# Patient Record
Sex: FEMALE | Race: WHITE | Employment: FULL TIME | URBAN - METROPOLITAN AREA
[De-identification: names, ages, dates, MRNs, and addresses within clinical notes are randomized per-mention and may not be internally consistent; named-entity substitution may affect disease eponyms.]

---

## 2017-05-22 ENCOUNTER — OFFICE VISIT (OUTPATIENT)
Dept: ONCOLOGY | Age: 57
End: 2017-05-22

## 2017-05-22 ENCOUNTER — HOSPITAL ENCOUNTER (OUTPATIENT)
Dept: LAB | Age: 57
Discharge: HOME | End: 2017-05-22
Payer: COMMERCIAL

## 2017-05-22 VITALS
WEIGHT: 175 LBS | RESPIRATION RATE: 18 BRPM | SYSTOLIC BLOOD PRESSURE: 141 MMHG | TEMPERATURE: 97.7 F | OXYGEN SATURATION: 94 % | BODY MASS INDEX: 29.12 KG/M2 | DIASTOLIC BLOOD PRESSURE: 84 MMHG | HEART RATE: 72 BPM

## 2017-05-22 DIAGNOSIS — Z12.72 SPECIAL SCREENING FOR MALIGNANT NEOPLASMS, VAGINA: ICD-10-CM

## 2017-05-22 DIAGNOSIS — Z90.710 VAGINAL PAP SMEAR FOLLOWING HYSTERECTOMY FOR MALIGNANCY: ICD-10-CM

## 2017-05-22 DIAGNOSIS — C54.1 ENDOMETRIAL CANCER (HCC): Primary | ICD-10-CM

## 2017-05-22 DIAGNOSIS — Z85.42 PERSONAL HISTORY OF MALIGNANT NEOPLASM OF OTHER PARTS OF UTERUS: ICD-10-CM

## 2017-05-22 DIAGNOSIS — Z08 VAGINAL PAP SMEAR FOLLOWING HYSTERECTOMY FOR MALIGNANCY: ICD-10-CM

## 2017-05-22 PROCEDURE — 88175 CYTOPATH C/V AUTO FLUID REDO: CPT | Performed by: OBSTETRICS & GYNECOLOGY

## 2017-05-22 RX ORDER — GLUCOSAMINE SULFATE 1500 MG
POWDER IN PACKET (EA) ORAL DAILY
COMMUNITY

## 2017-05-22 RX ORDER — LANOLIN ALCOHOL/MO/W.PET/CERES
1000 CREAM (GRAM) TOPICAL DAILY
COMMUNITY
End: 2020-01-22

## 2017-05-22 NOTE — PROGRESS NOTES
[facility]  [address] [place] [address], [address] [facility], [address] [place] [address]   [phone]  Yves Samayoa MD      Patient ID:  Name: Martin Meckel  MRN: [id number]  : 1960/56 y.o. Visit date: 2017    INTERVAL HISTORY: Martin Meckel is a 49 y.o.  female with a history of Stage 1A, G1 Endometrial Cancer, diagnosed 4/13/10 as incidental finding after [facility] by Dr. Sánchez Self at [facility]. S/P LPS trachelectomy/BSO/PPALND with no residual disease. She is being seen today for a yearly followup. Last PAP was 16 and was satis and neg. Patient denies any Gyn symptoms. Patient specifically denies any abnormal vaginal bleeding, vaginal discharge, or vaginal irritation. Patient also denies abdominal pain, pelvic pain, or abdominal bloating. Patient is voiding without difficulty and bowel movements are regular. IMAGING:  Last CT: at [facility] 13  Mammogram: 2014 WNL  Bone Scan: NA  Colonoscopy: 9/3/13 Dr. Neville Alfonso, 3 tubular adenomas, due in 2016    COMPREHENSIVE ROS:  Scanned in media and reviewed by me. Pertinent positives and negatives detailed in HPI. PMH:  New diagnosis of Type 2 DM  Past Medical History:   Diagnosis Date    Depression     Endomet[facility] ([facility] [address]     Hypertension     Type 2 diabetes mellitus ([facility] [address]        PSH:  Past Surgical History:   Procedure Laterality Date    HX OTHER SURGICAL      fallopian tubes, cervix, peritoneal biopsies, lymph nodes    HX SALPINGO-OOPHORECTOMY      LAP, SUPRACERVIAL HYSTERECTOMY, >250G  2010     SOC:  Social History     Social History    Marital status:      Spouse name: N/A    Number of children: N/A    Years of education: N/A     Occupational History    Not on file.      Social History Main Topics    Smoking status: Current Every Day Smoker    Smokeless tobacco: Not on file    Alcohol use Yes    Drug use: Not on file    Sexual activity: Not on file Other Topics Concern    Not on file     Social History Narrative     Family History  Family History   Problem Relation Age of Onset    Cancer Mother      breast    Diabetes Mother     Hypertension Mother     Hypertension Father      Medications:  Current Outpatient Prescriptions on File Prior to Visit   Medication Sig Dispense Refill    traZODone (DESYREL) 100 mg tablet Take 100 mg by mouth nightly.  UI-YW-ZY-Fe-Min-Lycopen-Lutein (CENTRUM) 0.4-162-18 mg tab Take  by mouth.  calcium-vitamin D (OYSTER SHELL) 500 mg(1,250mg) -200 unit per tablet Take 1 Tab by mouth two (2) times daily (with meals).  fenofibrate (TRICOR) 160 mg tablet Take 160 mg by mouth daily.  metFORMIN (GLUCOPHAGE) 500 mg tablet Take  by mouth two (2) times daily (with meals).  metoprolol (LOPRESSOR) 100 mg tablet Take  by mouth two (2) times a day.  omeprazole (PRILOSEC) 20 mg capsule Take 20 mg by mouth daily.  OMEGA-3 ACID ETHYL ESTERS (LOVAZA PO) Take  by mouth.  lisinopril (PRINIVIL, ZESTRIL) 20 mg tablet Take  by mouth daily.  citalopram (CELEXA) 40 mg tablet Take 40 mg by mouth daily. No current facility-administered medications on file prior to visit.         No Known Allergies      OBJECTIVE:  PHYSICAL EXAM  VITAL SIGNS: Visit Vitals    /84    Pulse 72    Temp 97.7 °F (36.5 °C) (Oral)    Resp 18    Wt 79.4 kg (175 lb)    SpO2 94%    BMI 29.12 kg/m2      GENERAL FABY: in no apparent distress and well developed and well nourished   HEENT: NCAT,EOMI,PERRL   RESPIRATORY: scattered wheezing and rhonchi, no crackles   CARDIOVASC: Regular rate and rhythm or without murmur or extra heart sounds   GASTROINT: soft, non-tender, non-distended, without masses or organomegaly, normal active bowel sounds   MUSCULOSKEL: no joint tenderness, deformity or swelling   INTEGUMENT:  warm and dry, no rashes or lesions   EXTREMITIES: extremities normal, atraumatic, no cyanosis or edema   PELVIC: External genitalia: normal general appearance  Urinary system: urethral meatus normal  Vaginal: normal mucosa without prolapse or lesions  Cervix: removed surgically  Adnexa: removed surgically  Uterus: removed surgically   RECTAL: deferred   LEX SURVEY: Cervical, supraclavicular, axillary and inguinal nodes normal.   NEURO: Grossly normal       IMPRESSION AND PLAN:  Stage IA, G1 Endometrial Cancer, CLARENCE  Pelvic with cytology done today. Stress urinary incontinence, unchanged. I counseled patient about referral to Urogynecologist and recommended Laurie Salvador MD. Patient will call if she decides to proceed with referral.  Patient's mother was diagnosed with breast cancer. Patient is up to date on Mammograms  Follow up in 12 months for EM CA surveillance. Brian Pelayo.  Bettina DUMONT  Gynecologic Oncology  8/27/923142:98 AM

## 2017-05-22 NOTE — MR AVS SNAPSHOT
Visit Information Date & Time Provider Department Dept. Phone Encounter #  
 5/22/2017  9:15 AM MD [facility] [phone] [id number] Your Appointments 5/23/2018  9:15 AM  
ANNUAL with MD [facility]) Appt Note: YEARLY  
 [address] [address] Upcoming Health Maintenance Date Due Hepatitis C Screening 1960 Pneumococcal 19-64 Highest Risk (1 of 3 - PCV13) 11/9/1981 DTaP/Tdap/Td series (1 - Tdap) 11/9/1983 BREAST CANCER SCRN MAMMOGRAM 11/9/2012 FOBT Q 1 YEAR AGE 50-75 11/9/2012 INFLUENZA AGE 9 TO ADULT 8/1/2017 PAP AKA CERVICAL CYTOLOGY 5/4/2019 Allergies as of 5/22/2017  Review Complete On: 5/22/2017 By: Que Beth MD  
 No Known Allergies Current Immunizations  Never Reviewed No immunizations on file. Not reviewed this visit You Were Diagnosed With   
  
 Codes Comments Endometrial cancer ([facility] [address]    -  Primary ICD-10-CM: C54.1 ICD-9-CM: 182.0 Special screening for malignant neoplasms, vagina     ICD-10-CM: Z12.72 
ICD-9-CM: V76.47 Vaginal Pap smear following hysterectomy for malignancy     ICD-10-CM: Z08, Z90.710 ICD-9-CM: V67.01 Personal history of malignant neoplasm of other parts of uterus     ICD-10-CM: Z85.42 
ICD-9-CM: V10.42 Vitals BP Pulse Temp Resp Weight(growth percentile) SpO2  
 141/84 72 97.7 °F (36.5 °C) (Oral) 18 175 lb (79.4 kg) 94% BMI OB Status Smoking Status 29.12 kg/m2 Hysterectomy Current Every Day Smoker BMI and BSA Data Body Mass Index Body Surface Area  
 29.12 kg/m 2 1.91 m 2 Preferred Pharmacy Pharmacy Name Phone [id number] [phone] Your Updated Medication List  
  
   
This list is accurate as of: 5/22/17 10:17 AM.  Always use your most recent med list.  
  
  
  
  
 calcium-vitamin D 500 mg(1,250mg) -200 unit per tablet Commonly known as:  OYSTER SHELL Take 1 Tab by mouth two (2) times daily (with meals). CENTRUM 0.4-162-18 mg Tab Generic drug:  CL-CG-NX-Fe-Min-Lycopen-Lutein Take  by mouth.  
  
 citalopram 40 mg tablet Commonly known as:  Juanita Keel Take 40 mg by mouth daily. cyanocobalamin 1,000 mcg tablet Take 1,000 mcg by mouth daily. fenofibrate 160 mg tablet Commonly known as:  LOFIBRA Take 160 mg by mouth daily. lisinopril 20 mg tablet Commonly known as:  Minus Salk Take  by mouth daily. LOVAZA PO Take  by mouth.  
  
 metFORMIN 500 mg tablet Commonly known as:  GLUCOPHAGE Take  by mouth two (2) times daily (with meals). metoprolol tartrate 100 mg IR tablet Commonly known as:  LOPRESSOR Take  by mouth two (2) times a day. omeprazole 20 mg capsule Commonly known as:  PRILOSEC Take 20 mg by mouth daily. traZODone 100 mg tablet Commonly known as:  Guillermo Risen Take 100 mg by mouth nightly. VITAMIN D3 1,000 unit Cap Generic drug:  cholecalciferol Take  by mouth daily. Introducing [facility]! [facility] introduces [employer] patient portal. Now you can access parts of your medical record, email your doctor's office, and request medication refills online. 1. In your internet browser, go to [email or web address] 2. Click on the First Time User? Click Here link in the Sign In box. You will see the New Member Sign Up page. 3. Enter your [employer] Access Code exactly as it appears below. You will not need to use this code after youve completed the sign-up process. If you do not sign up before the expiration date, you must request a new code.  
 
· [employer] Access Code: [email or web address] 
 Expires: 8/20/2017 10:17 AM 
 
4. Enter the last four digits of your Social Security Number (xxxx) and Date of Birth (mm/dd/yyyy) as indicated and click Submit. You will be taken to the next sign-up page. 5. Create a [employer] ID. This will be your [employer] login ID and cannot be changed, so think of one that is secure and easy to remember. 6. Create a [employer] password. You can change your password at any time. 7. Enter your Password Reset Question and Answer. This can be used at a later time if you forget your password. 8. Enter your e-mail address. You will receive e-mail notification when new information is available in [address] 9. Click Sign Up. You can now view and download portions of your medical record. 10. Click the Download Summary menu link to download a portable copy of your medical information. If you have questions, please visit the Frequently Asked Questions section of the [employer] website. Remember, [employer] is NOT to be used for urgent needs. For medical emergencies, dial 911. Now available from your iPhone and Android! Please provide this summary of care documentation to your next provider. Your primary care clinician is listed as Kristina Kevin. If you have any questions after today's visit, please call [phone].

## 2018-05-25 ENCOUNTER — OFFICE VISIT (OUTPATIENT)
Dept: ONCOLOGY | Age: 58
End: 2018-05-25

## 2018-05-25 VITALS
OXYGEN SATURATION: 98 % | HEIGHT: 65 IN | HEART RATE: 70 BPM | RESPIRATION RATE: 18 BRPM | DIASTOLIC BLOOD PRESSURE: 87 MMHG | SYSTOLIC BLOOD PRESSURE: 151 MMHG | TEMPERATURE: 97.9 F | WEIGHT: 168.6 LBS | BODY MASS INDEX: 28.09 KG/M2

## 2018-05-25 DIAGNOSIS — C54.1 ENDOMETRIAL CANCER (HCC): Primary | ICD-10-CM

## 2018-05-25 NOTE — MR AVS SNAPSHOT
[place] [address] 
[phone] Patient: Ly Rosado MRN: [id number] :1960 Visit Information Date & Time Provider Department Dept. Phone Encounter #  
 2018  9:00 AM Babs Muhammad NP [facility] [phone] [id number] Follow-up Instructions Return in about 1 year (around 2019). Upcoming Health Maintenance Date Due Hepatitis C Screening 1960 Pneumococcal 19-64 Highest Risk (1 of 3 - PCV13) 1981 DTaP/Tdap/Td series (1 - Tdap) 1983 FOBT Q 1 YEAR AGE 50-75 2012 Influenza Age 5 to Adult 2018 BREAST CANCER SCRN MAMMOGRAM 2020 PAP AKA CERVICAL CYTOLOGY 2020 Allergies as of 2018  Review Complete On: 2018 By: Babs Muhammad NP No Known Allergies Current Immunizations  Never Reviewed No immunizations on file. Not reviewed this visit Vitals BP Pulse Temp Resp Height(growth percentile) Weight(growth percentile) 151/87 (BP 1 Location: Right arm, BP Patient Position: Sitting) 70 97.9 °F (36.6 °C) (Oral) 18 5' 5\" (1.651 m) 168 lb 9.6 oz (76.5 kg) SpO2 BMI OB Status Smoking Status 98% 28.06 kg/m2 Hysterectomy Current Every Day Smoker BMI and BSA Data Body Mass Index Body Surface Area 28.06 kg/m 2 1.87 m 2 Your Updated Medication List  
  
   
This list is accurate as of 18  9:31 AM.  Always use your most recent med list.  
  
  
  
  
 calcium-vitamin D 500 mg(1,250mg) -200 unit per tablet Commonly known as:  OYSTER SHELL Take 1 Tab by mouth two (2) times daily (with meals). CENTRUM 0.4-162-18 mg Tab Generic drug:  HJ-NV-LQ-Fe-Min-Lycopen-Lutein Take  by mouth.  
  
 citalopram 40 mg tablet Commonly known as:  Cloyce Tram Take 40 mg by mouth daily. cyanocobalamin 1,000 mcg tablet Take 1,000 mcg by mouth daily. fenofibrate 160 mg tablet Commonly known as:  LOFIBRA Take 160 mg by mouth daily. lisinopril 20 mg tablet Commonly known as:  Edmundo Human Take  by mouth daily. LOVAZA PO Take  by mouth.  
  
 metFORMIN 500 mg tablet Commonly known as:  GLUCOPHAGE Take  by mouth two (2) times daily (with meals). metoprolol tartrate 100 mg IR tablet Commonly known as:  LOPRESSOR Take  by mouth two (2) times a day. omeprazole 20 mg capsule Commonly known as:  PRILOSEC Take 20 mg by mouth daily. traZODone 100 mg tablet Commonly known as:  Paula Veronica Take 100 mg by mouth nightly. VITAMIN D3 1,000 unit Cap Generic drug:  cholecalciferol Take  by mouth daily. Follow-up Instructions Return in about 1 year (around 5/25/2019). Patient Instructions Health Maintenance Due Topic Date Due  
 Hepatitis C Screening  1960  Pneumococcal 19-64 Highest Risk (1 of 3 - PCV13) 11/09/1981  
 DTaP/Tdap/Td series (1 - Tdap) 11/09/1983  
 FOBT Q 1 YEAR AGE 50-75  11/09/2012 Below you will find information on the condition you were previously diagnosed with. Please call the office as soon as possible if you begin to experience the following symptoms: Persistent or worsening abdominal or pelvic pain, any unusual vaginal bleeding, discharge, odor, or irritation, and any changes in bladder or bowel habits such as constipation, diarrhea, burning, or general discomfort. Please call the office if you have any other questions or concerns. Uterine Cancer: Care Instructions Your Care Instructions Uterine cancer is the rapid growth of abnormal cells that line the uterus. It also is called endometrial cancer. These cells may spread to nearby organs, lymph glands, or distant organs. Uterine cancer can be cured most often when found early.  
Treatment may include surgery to remove the uterus, ovaries, fallopian tubes, and sometimes the pelvic lymph nodes. Radiation and hormones to stop cancer growth also are sometimes used. Chemotherapy may be used if the cancer has spread. Having cancer can be scary. You may feel many emotions and may need some help coping. Seek out family, friends, and counselors for support. You can do things at home to make yourself feel better while you go through treatment. You also can call the [employer] ([phone]) or visit its website at [address] [employer] for more information. Follow-up care is a key part of your treatment and safety. Be sure to make and go to all appointments, and call your doctor if you are having problems. It's also a good idea to know your test results and keep a list of the medicines you take. How can you care for yourself at home? · Take your medicines exactly as prescribed. Call your doctor if you think you are having a problem with your medicine. You may get medicine for nausea and vomiting if you have these side effects. · Eat healthy food. If you do not feel like eating, try to eat food that has protein and extra calories to keep up your strength and prevent weight loss. Drink liquid meal replacements for extra calories and protein. Try to eat your main meal early. · Get some physical activity every day, but do not get too tired. Keep doing the hobbies you enjoy as your energy allows. · Take steps to control your stress and workload. Learn relaxation techniques. ¨ Share your feelings. Stress and tension affect our emotions. By expressing your feelings to others, you may be able to understand and cope with them. ¨ Consider joining a support group. Talking about a problem with your spouse, a good friend, or other people with similar problems is a good way to reduce tension and stress. ¨ Express yourself through art. Try writing, dance, art, or crafts to relieve tension.  Some dance, writing, or art groups may be available just for people who have cancer. ¨ Be kind to your body and mind. Getting enough sleep, eating a healthy diet, and taking time to do things you enjoy can contribute to an overall feeling of balance in your life and help reduce stress. ¨ Get help if you need it. Discuss your concerns with your doctor or counselor. · If you are vomiting or have diarrhea: ¨ Drink plenty of fluids (enough so that your urine is light yellow or clear like water) to prevent dehydration. Choose water and other caffeine-free clear liquids. If you have kidney, heart, or liver disease and have to limit fluids, talk with your doctor before you increase the amount of fluids you drink. ¨ When you are able to eat, try clear soups, mild foods, and liquids until all symptoms are gone for 12 to 48 hours. Other good choices include dry toast, crackers, cooked cereal, and gelatin dessert, such as Jell-O. 
· Take care of your urinary tract to prevent problems such as infection, which can be caused by uterine cancer and its treatment. Limit drinks with caffeine, drink plenty of fluids, and urinate every 3 to 4 hours. · If you have not already done so, prepare a list of advance directives. Advance directives are instructions to your doctor and family members about what kind of care you want if you become unable to speak or express yourself. When should you call for help? Call 911 anytime you think you may need emergency care. For example, call if: 
? · You passed out (lost consciousness). ?Call your doctor now or seek immediate medical care if: 
? · You have a fever. ? · You have abnormal bleeding. ? · You have new or worse pain. ? · You think you have an infection. ? · You have new symptoms, such as a cough, belly pain, vomiting, diarrhea, or a rash. ? Watch closely for changes in your health, and be sure to contact your doctor if: 
? · You are much more tired than usual.  
? · You have swollen glands in your armpits, groin, or neck. ? · You do not get better as expected. Where can you learn more? Go to http://samanta-damián.info/. Enter E343 in the search box to learn more about \"Uterine Cancer: Care Instructions. \" Current as of: May 12, 2017 Content Version: 11.4 © 5487-7444 [employer]. Care instructions adapted under license by [employer] (which disclaims liability or warranty for this information). If you have questions about a medical condition or this instruction, always ask your healthcare professional. [address] any warranty or liability for your use of this information. Stopping Smoking: Care Instructions Your Care Instructions Cigarette smokers crave the nicotine in cigarettes. Giving it up is much harder than simply changing a habit. Your body has to stop craving the nicotine. It is hard to quit, but you can do it. There are many tools that people use to quit smoking. You may find that combining tools works best for you. There are several steps to quitting. First you get ready to quit. Then you get support to help you. After that, you learn new skills and behaviors to become a nonsmoker. For many people, a necessary step is getting and using medicine. Your doctor will help you set up the plan that best meets your needs. You may want to attend a smoking cessation program to help you quit smoking. When you choose a program, look for one that has proven success. Ask your doctor for ideas. You will greatly increase your chances of success if you take medicine as well as get counseling or join a cessation program. 
Some of the changes you feel when you first quit tobacco are uncomfortable. Your body will miss the nicotine at first, and you may feel short-tempered and grumpy. You may have trouble sleeping or concentrating. Medicine can help you deal with these symptoms.  You may struggle with changing your smoking habits and rituals. The last step is the tricky one: Be prepared for the smoking urge to continue for a time. This is a lot to deal with, but keep at it. You will feel better. Follow-up care is a key part of your treatment and safety. Be sure to make and go to all appointments, and call your doctor if you are having problems. It's also a good idea to know your test results and keep a list of the medicines you take. How can you care for yourself at home? · Ask your family, friends, and coworkers for support. You have a better chance of quitting if you have help and support. · Join a support group, such as Nicotine Anonymous, for people who are trying to quit smoking. · Consider signing up for a smoking cessation program, such as the American Lung Association's Freedom from Smoking program. 
· Set a quit date. Pick your date carefully so that it is not right in the middle of a big deadline or stressful time. Once you quit, do not even take a puff. Get rid of all ashtrays and lighters after your last cigarette. Clean your house and your clothes so that they do not smell of smoke. · Learn how to be a nonsmoker. Think about ways you can avoid those things that make you reach for a cigarette. ¨ Avoid situations that put you at greatest risk for smoking. For some people, it is hard to have a drink with friends without smoking. For others, they might skip a coffee break with coworkers who smoke. ¨ Change your daily routine. Take a different route to work or eat a meal in a different place. · Cut down on stress. Calm yourself or release tension by doing an activity you enjoy, such as reading a book, taking a hot bath, or gardening. · Talk to your doctor or pharmacist about nicotine replacement therapy, which replaces the nicotine in your body.  You still get nicotine but you do not use tobacco. Nicotine replacement products help you slowly reduce the amount of nicotine you need. These products come in several forms, many of them available over-the-counter: ¨ Nicotine patches ¨ Nicotine gum and lozenges ¨ Nicotine inhaler · Ask your doctor about bupropion (Wellbutrin) or varenicline (Chantix), which are prescription medicines. They do not contain nicotine. They help you by reducing withdrawal symptoms, such as stress and anxiety. · Some people find hypnosis, acupuncture, and massage helpful for ending the smoking habit. · Eat a healthy diet and get regular exercise. Having healthy habits will help your body move past its craving for nicotine. · Be prepared to keep trying. Most people are not successful the first few times they try to quit. Do not get mad at yourself if you smoke again. Make a list of things you learned and think about when you want to try again, such as next week, next month, or next year. Where can you learn more? Go to http://samantaStreamfiledamián.info/. Enter L297 in the search box to learn more about \"Stopping Smoking: Care Instructions. \" Current as of: March 20, 2017 Content Version: 11.4 © 5283-1978 [employer]. Care instructions adapted under license by [employer] (which disclaims liability or warranty for this information). If you have questions about a medical condition or this instruction, always ask your healthcare professional. [address] any warranty or liability for your use of this information. Introducing [facility]! Elodia Espana introduces [employer] patient portal. Now you can access parts of your medical record, email your doctor's office, and request medication refills online. 1. In your internet browser, go to [email or web address] 2. Click on the First Time User? Click Here link in the Sign In box. You will see the New Member Sign Up page. 3. Enter your [employer] Access Code exactly as it appears below.  You will not need to use this code after youve completed the sign-up process. If you do not sign up before the expiration date, you must request a new code. · [employer] Access Code: [email or web address] Expires: 8/23/2018  9:14 AM 
 
4. Enter the last four digits of your Social Security Number (xxxx) and Date of Birth (mm/dd/yyyy) as indicated and click Submit. You will be taken to the next sign-up page. 5. Create a [employer] ID. This will be your [employer] login ID and cannot be changed, so think of one that is secure and easy to remember. 6. Create a [employer] password. You can change your password at any time. 7. Enter your Password Reset Question and Answer. This can be used at a later time if you forget your password. 8. Enter your e-mail address. You will receive e-mail notification when new information is available in [address]. 9. Click Sign Up. You can now view and download portions of your medical record. 10. Click the Download Summary menu link to download a portable copy of your medical information. If you have questions, please visit the Frequently Asked Questions section of the [employer] website. Remember, [employer] is NOT to be used for urgent needs. For medical emergencies, dial 911. Now available from your iPhone and Android! Please provide this summary of care documentation to your next provider. Your primary care clinician is listed as Nathalia Peacock. If you have any questions after today's visit, please call [phone].

## 2018-05-25 NOTE — PROGRESS NOTES
Alejandra Bryan, a 56 y.o. female,  is here for   Chief Complaint   Patient presents with    Uterine Cancer     1 year surveillance       Visit Vitals    /87 (BP 1 Location: Right arm, BP Patient Position: Sitting)    Pulse 70    Temp 97.9 °F (36.6 °C) (Oral)    Resp 18    Ht 5' 5\" (1.651 m)    Wt 76.5 kg (168 lb 9.6 oz)    SpO2 98%    BMI 28.06 kg/m2     Patient denies any persistent or worsening abdominal or pelvic pain. Denies any unusual vaginal bleeding, discharge, irritation, or odor. Denies experiencing any constipation or diarrhea. No burning, discomfort, or irritation with urination. Denies any headaches, dizziness, or changes in vision. States that she takes her BP in the evening and \"sometimes its elevated but usually it's normal.\" Declined having it taken again after her exam.    1. Have you been to the ER, urgent care clinic since your last visit? Hospitalized since your last visit? No    2. Have you seen or consulted any other health care providers outside of the [facility] since your last visit? Include any pap smears or colon screening.  Yes Where: [facility] Reason for visit: breast exam, mammogram

## 2018-05-25 NOTE — PATIENT INSTRUCTIONS
Health Maintenance Due   Topic Date Due    Hepatitis C Screening  1960    Pneumococcal 19-64 Highest Risk (1 of 3 - PCV13) 11/09/1981    DTaP/Tdap/Td series (1 - Tdap) 11/09/1983    FOBT Q 1 YEAR AGE 50-75  11/09/2012         Below you will find information on the condition you were previously diagnosed with. Please call the office as soon as possible if you begin to experience the following symptoms: Persistent or worsening abdominal or pelvic pain, any unusual vaginal bleeding, discharge, odor, or irritation, and any changes in bladder or bowel habits such as constipation, diarrhea, burning, or general discomfort. Please call the office if you have any other questions or concerns. Uterine Cancer: Care Instructions  Your Care Instructions  Uterine cancer is the rapid growth of abnormal cells that line the uterus. It also is called endometrial cancer. These cells may spread to nearby organs, lymph glands, or distant organs. Uterine cancer can be cured most often when found early. Treatment may include surgery to remove the uterus, ovaries, fallopian tubes, and sometimes the pelvic lymph nodes. Radiation and hormones to stop cancer growth also are sometimes used. Chemotherapy may be used if the cancer has spread. Having cancer can be scary. You may feel many emotions and may need some help coping. Seek out family, friends, and counselors for support. You can do things at home to make yourself feel better while you go through treatment. You also can call the [employer] (0-163.143.7471) or visit its website at [employer] [employer] for more information. Follow-up care is a key part of your treatment and safety. Be sure to make and go to all appointments, and call your doctor if you are having problems. It's also a good idea to know your test results and keep a list of the medicines you take. How can you care for yourself at home? · Take your medicines exactly as prescribed.  Call your doctor if you think you are having a problem with your medicine. You may get medicine for nausea and vomiting if you have these side effects. · Eat healthy food. If you do not feel like eating, try to eat food that has protein and extra calories to keep up your strength and prevent weight loss. Drink liquid meal replacements for extra calories and protein. Try to eat your main meal early. · Get some physical activity every day, but do not get too tired. Keep doing the hobbies you enjoy as your energy allows. · Take steps to control your stress and workload. Learn relaxation techniques. ¨ Share your feelings. Stress and tension affect our emotions. By expressing your feelings to others, you may be able to understand and cope with them. ¨ Consider joining a support group. Talking about a problem with your spouse, a good friend, or other people with similar problems is a good way to reduce tension and stress. ¨ Express yourself through art. Try writing, dance, art, or crafts to relieve tension. Some dance, writing, or art groups may be available just for people who have cancer. ¨ Be kind to your body and mind. Getting enough sleep, eating a healthy diet, and taking time to do things you enjoy can contribute to an overall feeling of balance in your life and help reduce stress. ¨ Get help if you need it. Discuss your concerns with your doctor or counselor. · If you are vomiting or have diarrhea:  ¨ Drink plenty of fluids (enough so that your urine is light yellow or clear like water) to prevent dehydration. Choose water and other caffeine-free clear liquids. If you have kidney, heart, or liver disease and have to limit fluids, talk with your doctor before you increase the amount of fluids you drink. ¨ When you are able to eat, try clear soups, mild foods, and liquids until all symptoms are gone for 12 to 48 hours.  Other good choices include dry toast, crackers, cooked cereal, and gelatin dessert, such as Leanna-TEDDY.  · Take care of your urinary tract to prevent problems such as infection, which can be caused by uterine cancer and its treatment. Limit drinks with caffeine, drink plenty of fluids, and urinate every 3 to 4 hours. · If you have not already done so, prepare a list of advance directives. Advance directives are instructions to your doctor and family members about what kind of care you want if you become unable to speak or express yourself. When should you call for help? Call 911 anytime you think you may need emergency care. For example, call if:  ? · You passed out (lost consciousness). ?Call your doctor now or seek immediate medical care if:  ? · You have a fever. ? · You have abnormal bleeding. ? · You have new or worse pain. ? · You think you have an infection. ? · You have new symptoms, such as a cough, belly pain, vomiting, diarrhea, or a rash. ? Watch closely for changes in your health, and be sure to contact your doctor if:  ? · You are much more tired than usual.   ? · You have swollen glands in your armpits, groin, or neck. ? · You do not get better as expected. Where can you learn more? Go to http://samanta-damián.info/. Enter E343 in the search box to learn more about \"Uterine Cancer: Care Instructions. \"  Current as of: May 12, 2017  Content Version: 11.4  © 3241-4790 [place] . Care instructions adapted under license by [employer] (which disclaims liability or warranty for this information). If you have questions about a medical condition or this instruction, always ask your healthcare professional. [address] any warranty or liability for your use of this information. Stopping Smoking: Care Instructions  Your Care Instructions    Cigarette smokers crave the nicotine in cigarettes. Giving it up is much harder than simply changing a habit. Your body has to stop craving the nicotine.  It is hard to quit, but you can do it. There are many tools that people use to quit smoking. You may find that combining tools works best for you. There are several steps to quitting. First you get ready to quit. Then you get support to help you. After that, you learn new skills and behaviors to become a nonsmoker. For many people, a necessary step is getting and using medicine. Your doctor will help you set up the plan that best meets your needs. You may want to attend a smoking cessation program to help you quit smoking. When you choose a program, look for one that has proven success. Ask your doctor for ideas. You will greatly increase your chances of success if you take medicine as well as get counseling or join a cessation program.  Some of the changes you feel when you first quit tobacco are uncomfortable. Your body will miss the nicotine at first, and you may feel short-tempered and grumpy. You may have trouble sleeping or concentrating. Medicine can help you deal with these symptoms. You may struggle with changing your smoking habits and rituals. The last step is the tricky one: Be prepared for the smoking urge to continue for a time. This is a lot to deal with, but keep at it. You will feel better. Follow-up care is a key part of your treatment and safety. Be sure to make and go to all appointments, and call your doctor if you are having problems. It's also a good idea to know your test results and keep a list of the medicines you take. How can you care for yourself at home? · Ask your family, friends, and coworkers for support. You have a better chance of quitting if you have help and support. · Join a support group, such as Nicotine Anonymous, for people who are trying to quit smoking. · Consider signing up for a smoking cessation program, such as the American Lung Association's Freedom from Smoking program.  · Set a quit date.  Pick your date carefully so that it is not right in the middle of a big deadline or stressful time. Once you quit, do not even take a puff. Get rid of all ashtrays and lighters after your last cigarette. Clean your house and your clothes so that they do not smell of smoke. · Learn how to be a nonsmoker. Think about ways you can avoid those things that make you reach for a cigarette. ¨ Avoid situations that put you at greatest risk for smoking. For some people, it is hard to have a drink with friends without smoking. For others, they might skip a coffee break with coworkers who smoke. ¨ Change your daily routine. Take a different route to work or eat a meal in a different place. · Cut down on stress. Calm yourself or release tension by doing an activity you enjoy, such as reading a book, taking a hot bath, or gardening. · Talk to your doctor or pharmacist about nicotine replacement therapy, which replaces the nicotine in your body. You still get nicotine but you do not use tobacco. Nicotine replacement products help you slowly reduce the amount of nicotine you need. These products come in several forms, many of them available over-the-counter:  ¨ Nicotine patches  ¨ Nicotine gum and lozenges  ¨ Nicotine inhaler  · Ask your doctor about bupropion (Wellbutrin) or varenicline (Chantix), which are prescription medicines. They do not contain nicotine. They help you by reducing withdrawal symptoms, such as stress and anxiety. · Some people find hypnosis, acupuncture, and massage helpful for ending the smoking habit. · Eat a healthy diet and get regular exercise. Having healthy habits will help your body move past its craving for nicotine. · Be prepared to keep trying. Most people are not successful the first few times they try to quit. Do not get mad at yourself if you smoke again. Make a list of things you learned and think about when you want to try again, such as next week, next month, or next year. Where can you learn more? Go to http://samanta-damián.info/.   Enter C971 in the search box to learn more about \"Stopping Smoking: Care Instructions. \"  Current as of: March 20, 2017  Content Version: 11.4  © 0308-7741 [employer], [employer]. Care instructions adapted under license by [employer] (which disclaims liability or warranty for this information). If you have questions about a medical condition or this instruction, always ask your healthcare professional. [address] any warranty or liability for your use of this information.

## 2018-05-25 NOTE — PROGRESS NOTES
[facility]  [address] [place] [address] Drive, [address], [address] [facility] [place]   [phone]  [facility]        Patient ID:  Name: Victor Manuel Juan  MRN: [id number]  : 1960/57 y.o. Visit date: 2018    INTERVAL HISTORY: Victor Manuel Juan is a 56 y.o.  female with a history of Stage 1A, G1 Endometrial Cancer, diagnosed 4/13/10 as incidental finding after [address] by Dr. Vickie Neil at [facility]. S/P LPS trachelectomy/BSO/PPALND with no residual disease. She is being seen today for a yearly followup. Last PAP 17 was satis and neg. Patient denies any Gyn symptoms. Patient specifically denies any abnormal vaginal bleeding, vaginal discharge, or vaginal irritation. Patient also denies abdominal pain, pelvic pain, or abdominal bloating. Patient is voiding without difficulty and bowel movements are regular. IMAGING:  Last CT: at [place] [id number]  Mammogram: May 2018 WNL   Bone Scan: NA  Colonoscopy: 9/3/13 Dr. Maia Santa, 3 tubular adenomas, overdue    COMPREHENSIVE ROS:  As above      PMH:  New diagnosis of Type 2 DM  Past Medical History:   Diagnosis Date    Depression     Endometrial cancer ([facility] [address]     Hypertension     Type 2 diabetes mellitus ([facility] [address]        PSH:  Past Surgical History:   Procedure Laterality Date    HX OTHER SURGICAL      fallopian tubes, cervix, peritoneal biopsies, lymph nodes    HX SALPINGO-OOPHORECTOMY      LAP, SUPRACERVIAL HYSTERECTOMY, >250G  2010     SOC:  Social History     Social History    Marital status:      Spouse name: N/A    Number of children: N/A    Years of education: N/A     Occupational History    Not on file.      Social History Main Topics    Smoking status: Current Every Day Smoker    Smokeless tobacco: Not on file    Alcohol use Yes    Drug use: Not on file    Sexual activity: Not on file     Other Topics Concern    Not on file     Social History Narrative     Family History  Family History   Problem Relation Age of Onset    Cancer Mother      breast    Diabetes Mother     Hypertension Mother     Hypertension Father      Medications:  Current Outpatient Prescriptions on File Prior to Visit   Medication Sig Dispense Refill    cholecalciferol (VITAMIN D3) 1,000 unit cap Take  by mouth daily.  cyanocobalamin 1,000 mcg tablet Take 1,000 mcg by mouth daily.  traZODone (DESYREL) 100 mg tablet Take 100 mg by mouth nightly.  UI-AJ-GC-Fe-Min-Lycopen-Lutein (CENTRUM) 0.4-162-18 mg tab Take  by mouth.  calcium-vitamin D (OYSTER SHELL) 500 mg(1,250mg) -200 unit per tablet Take 1 Tab by mouth two (2) times daily (with meals).  fenofibrate (TRICOR) 160 mg tablet Take 160 mg by mouth daily.  metFORMIN (GLUCOPHAGE) 500 mg tablet Take  by mouth two (2) times daily (with meals).  metoprolol (LOPRESSOR) 100 mg tablet Take  by mouth two (2) times a day.  omeprazole (PRILOSEC) 20 mg capsule Take 20 mg by mouth daily.  OMEGA-3 ACID ETHYL ESTERS (LOVAZA PO) Take  by mouth.  lisinopril (PRINIVIL, ZESTRIL) 20 mg tablet Take  by mouth daily.  citalopram (CELEXA) 40 mg tablet Take 40 mg by mouth daily. No current facility-administered medications on file prior to visit. No Known Allergies      OBJECTIVE:  PHYSICAL EXAM  VITAL SIGNS: There were no vitals taken for this visit.    GENERAL FABY: in no apparent distress and well developed and well nourished   HEENT: NCAT,EOMI,PERRL   RESPIRATORY: scattered wheezing and rhonchi, no crackles   CARDIOVASC: Regular rate and rhythm or without murmur or extra heart sounds   GASTROINT: soft, non-tender, non-distended, without masses or organomegaly, normal active bowel sounds   MUSCULOSKEL: no joint tenderness, deformity or swelling   INTEGUMENT:  warm and dry, no rashes or lesions   EXTREMITIES: extremities normal, atraumatic, no cyanosis or edema   PELVIC: External genitalia: normal general appearance  Urinary system: urethral meatus normal  Vaginal: normal mucosa without prolapse or lesions  Cervix: removed surgically  Adnexa: removed surgically  Uterus: removed surgically   RECTAL: deferred   LEX SURVEY: Cervical, supraclavicular, axillary and inguinal nodes normal.   NEURO: Grossly normal       IMPRESSION AND PLAN:  Stage IA, G1 Endometrial Cancer, CLARENCE  Pelvic done today. Patient's mother was diagnosed with breast cancer. Patient is up to date on Mammograms, but overdue for colonoscopy. Encouraged patient to schedule colonoscopy ASAP. Follow up in 12 months for EM CA surveillance.     Chata Phipps [facility]  Gynecologic Oncology  5/25/2018 9:22 AM

## 2020-01-22 ENCOUNTER — OFFICE VISIT (OUTPATIENT)
Dept: HEMATOLOGY | Age: 60
End: 2020-01-22

## 2020-01-22 ENCOUNTER — HOSPITAL ENCOUNTER (OUTPATIENT)
Dept: LAB | Age: 60
Discharge: HOME | End: 2020-01-22

## 2020-01-22 VITALS
WEIGHT: 174 LBS | OXYGEN SATURATION: 98 % | BODY MASS INDEX: 28.99 KG/M2 | HEART RATE: 72 BPM | DIASTOLIC BLOOD PRESSURE: 82 MMHG | HEIGHT: 65 IN | SYSTOLIC BLOOD PRESSURE: 147 MMHG | TEMPERATURE: 99.2 F

## 2020-01-22 DIAGNOSIS — K70.30 ALCOHOLIC CIRRHOSIS OF LIVER WITHOUT ASCITES (HCC): Primary | ICD-10-CM

## 2020-01-22 PROBLEM — Z98.890 H/O VENTRAL HERNIA REPAIR: Status: ACTIVE | Noted: 2020-01-22

## 2020-01-22 PROBLEM — K74.60 CIRRHOSIS (HCC): Status: ACTIVE | Noted: 2020-01-22

## 2020-01-22 PROBLEM — Z90.710 S/P TAH (TOTAL ABDOMINAL HYSTERECTOMY): Status: ACTIVE | Noted: 2020-01-22

## 2020-01-22 PROBLEM — F10.11 ALCOHOL ABUSE, IN REMISSION: Status: ACTIVE | Noted: 2020-01-22

## 2020-01-22 PROBLEM — Z87.19 H/O VENTRAL HERNIA REPAIR: Status: ACTIVE | Noted: 2020-01-22

## 2020-01-22 LAB
ETHANOL SERPL-MCNC: <3 MG/DL (ref 0–3)
SENTARA SPECIMEN COL,SENBCF: NORMAL

## 2020-01-22 PROCEDURE — 99001 SPECIMEN HANDLING PT-LAB: CPT

## 2020-01-22 PROCEDURE — 80307 DRUG TEST PRSMV CHEM ANLYZR: CPT

## 2020-01-22 NOTE — Clinical Note
1/29/20 Patient: Danuta Sherman YOB: 1960 Date of Visit: 1/22/2020 Rosealee Blizzard, MD 
1953 [facility][note 3][employer] [address] [place] [address] VIA Facsimile: [phone] Dear Rosealee Blizzard, MD, Thank you for referring Ms. Tru Gallegos to [address] [place] [address],[address] for evaluation. My notes for this consultation are attached. If you have questions, please do not hesitate to call me. I look forward to following your patient along with you. Sincerely, Jaz Iraheta MD

## 2020-01-22 NOTE — PROGRESS NOTES
[address] [facility], MD, MD Paulette Arnold, GOMEZ Lozano, [facility]-JUSTIN Singh S Kenyon, [employer]   Jason Rob MITCHELL-MARCO A Luis, [employer] [facility]       Bill Sanchez [address]    at [address], [address] [place] [address]  [address].    [phone]    FAX: [address] [place] [address]    at [address], [address], [address]    [phone]    FAX: [phone]       Patient Care Team:  Santo Mak MD as PCP - General (Family Practice)  Noy Christensen MD as Physician (Gynecologic Oncology)      Problem List  Date Reviewed: 1/22/2020          Codes Class Noted    Cirrhosis [facility]) ICD-10-CM: K74.60  ICD-9-CM: 571.5  1/22/2020        S/P JOSELITO (total abdominal hysterectomy) ICD-10-CM: Z90.710  ICD-9-CM: V88.01  1/22/2020        H/O ventral hernia repair ICD-10-CM: Z98.890, Z87.19  ICD-9-CM: V15.29  1/22/2020        Alcohol abuse, in remission ICD-10-CM: F10.11  ICD-9-CM: 305.03  1/22/2020        Endometrial cancer [facility]) ICD-10-CM: C54.1  ICD-9-CM: 182.0  4/29/2013                The clinicians listed above have asked me to see Adore Hussein in consultation regarding management of cirrhosis secondary to alcohol. All medical records sent by the referring physicians were reviewed including imaging studies     The patient is a 64 y.o.  female who was found to have cirrhosis in 11/2019 when she developed swelling of the abdomen and had a CT scan. She was given diuretics but for only 5 days. She felt better but ran out of diuretics and has not gotten any more. An assessment of liver fibrosis with biopsy or elastography has not been performed.       Serologic evaluation for markers of chronic liver disease has either not been performed or the results are not available. The patient has not developed any of the major complications of cirrhosis to date. The patient has no symptoms which can be attributed to the liver disorder. The patient is not currently experiencing the following symptoms of liver disease:  fatigue, pain in the right side over the liver,     The patient completes all daily activities without any functional limitations. ASSESSMENT AND PLAN:  Cirrhosis  Cirrhosis is presumed secondary to alcohol. The diagnosis of cirrhosis is based upon imaging, laboratory studies    Have performed laboratory testing to monitor liver function and degree of liver injury. This included BMP, hepatic panel, CBC with platelet count, INR. Liver transaminases are normal.  ALP is normal.  Liver function is normal.  The platelet count is normal.      Serologic testing for causes of chronic liver disease were ordered. All were negative     The patient has normal liver function. The patient has never developed any complications of cirrhosis to date. The CTP is 5. Child class A. The MELD score is 8. Ascites   Ascites has not been documented. She was given 5 days of diuretics for abdominal swelling but the CT scan in the ED did not show any ascites. Screening for Esophageal varices   The patient has not had an EGD to screen for varices. Will schedule for EGD to assess for varices and need for banding. Hepatic encephalopathy   Overt HE has not developed to date. There is no need for treatment with lactulose and/or Xifaxan at this time. There is no need to restrict dietary protein at this time. Thrombocytopenia   This is secondary to cirrhosis. There is no evidence of overt bleeding. No treatment is required. The platelet count is adequate for the patient to undergo procedures without the need for platelet transfusion or platelet growth factors.     Screening for Hepatocellular Carcinoma  HCC screening has recently been performed and does not suggest [facility] [address] The next liver imaging study will be performed in 5/2020. Treatment of other medical problems in patients with chronic liver disease  There are no contraindications for the patient to take most medications that are necessary for treatment of other medical issues. The patient has cirrhrosis and should avoid taking NSAIDs which are associated with a higher rate of developing GERSON. Counseling for alcohol in patients with chronic liver disease  The patient has cirrhosis and was advised to be abstinent from all alcohol including non-alcoholic beer which does contain some alcohol. The patient has not consumed alcohol since 11/2019. Osteoporosis  The risk of osteoporosis is increased in patients with cirrhosis. DEXA bone density to assess for osteoporosis has not been performed. This should be ordered by the patients primary care physician. Vaccinations   Vaccination for viral hepatitis A and B is recommended since the patient has no serologic evidence of previous exposure or vaccination with immunity. Routine vaccinations against other bacterial and viral agents can be performed as indicated. Annual flu vaccination should be administered if indicated. ALLERGIES  No Known Allergies    MEDICATIONS  Current Outpatient Medications   Medication Sig    cholecalciferol (VITAMIN D3) 1,000 unit cap Take  by mouth daily.  traZODone (DESYREL) 100 mg tablet Take 100 mg by mouth nightly.  WO-ZR-AQ-Fe-Min-Lycopen-Lutein (CENTRUM) 0.4-162-18 mg tab Take  by mouth.  fenofibrate (TRICOR) 160 mg tablet Take 160 mg by mouth daily.  metFORMIN (GLUCOPHAGE) 500 mg tablet Take  by mouth two (2) times daily (with meals).  metoprolol (LOPRESSOR) 100 mg tablet Take  by mouth two (2) times a day.  omeprazole (PRILOSEC) 20 mg capsule Take 20 mg by mouth daily.  OMEGA-3 ACID ETHYL ESTERS (LOVAZA PO) Take  by mouth.  lisinopril (PRINIVIL, ZESTRIL) 20 mg tablet Take  by mouth daily.  citalopram (CELEXA) 40 mg tablet Take 40 mg by mouth daily.  cyanocobalamin 1,000 mcg tablet Take 1,000 mcg by mouth daily.  calcium-vitamin D (OYSTER SHELL) 500 mg(1,250mg) -200 unit per tablet Take 1 Tab by mouth two (2) times daily (with meals). No current facility-administered medications for this visit. SYSTEM REVIEW NOT RELATED TO LIVER DISEASE OR REVIEWED ABOVE:  Constitution systems: Negative for fever, chills, weight gain, weight loss. Eyes: Negative for visual changes. ENT: Negative for sore throat, painful swallowing. Respiratory: Negative for cough, hemoptysis, SOB. Cardiology: Negative for chest pain, palpitations. GI:  Negative for constipation or diarrhea. : Negative for urinary frequency, dysuria, hematuria, nocturia. Skin: Negative for rash. Hematology: Negative for easy bruising, blood clots. Musculo-skelatal: Negative for back pain, muscle pain, weakness. Neurologic: Negative for headaches, dizziness, vertigo, memory problems not related to HE. Psychology: Negative for anxiety, depression. FAMILY HISTORY:  The father  of mesothelioma. The mother  of breast cancer. There is no family history of liver disease. SOCIAL HISTORY:  The patient is . The patient has 2 children, and 2 grandchildren. The patient stopped using tobacco products in 2019. The patient consumes 1/2 pint of alcoholic per day   The patient has been abstinent from alcohol since 2019. The patient currently works full time as an  for [employer]. PHYSICAL EXAMINATION:  Visit Vitals  /82 (BP 1 Location: Left arm, BP Patient Position: Sitting)   Pulse 72   Temp 99.2 °F (37.3 °C)   Ht 5' 5\" (1.651 m)   Wt 174 lb (78.9 kg)   SpO2 98%   BMI 28.96 kg/m²     General: No acute distress. Eyes: Sclera anicteric. ENT: No oral lesions.   Thyroid normal.  Nodes: No adenopathy. Skin: No spider angiomata. No jaundice. No palmar erythema. Respiratory: Lungs clear to auscultation. Cardiovascular: Regular heart rate. No murmurs. No JVD. Abdomen: Soft non-tender. Liver size normal to percussion/palpation. Spleen not palpable. No obvious ascites. Extremities: No edema. No muscle wasting. No gross arthritic changes. Neurologic: Alert and oriented. Cranial nerves grossly intact. No asterixis. LABORATORY STUDIES:  [facility] [address] & Units 1/22/2020   WBC 4.0 - 11.0 K/uL 6.3   ANC 1.8 - 7.7 K/uL 3.8   HGB 11.7 - 16.0 g/dL 13.4    - 440 K/uL 120 (L)   INR 0.89 - 1.29 1.06   AST 10 - 37 U/L 31   ALT 5 - 40 U/L 27   Alk Phos 25 - 115 U/L 38   Bili, Total 0.2 - 1.2 mg/dL 0.5   Bili, Direct 0.0 - 0.3 mg/dL 0.2   Albumin 3.5 - 5.0 g/dL 4.6   BUN 6 - 22 mg/dL 18   Creat 0.5 - 1.2 mg/dL 0.7   Na 133 - 145 mmol/L 140   K 3.5 - 5.5 mmol/L 4.1   Cl 98 - 110 mmol/L 101   CO2 20 - 32 mmol/L 25   Glucose 70 - 99 mg/dL 111 (H)     Cancer Screening Latest Ref Rng & Units 1/22/2020   AFP, Serum 0.0 - 8.0 ng/mL 7.3   AFP-L3% 0.0 - 9.9 % 7.3     SEROLOGIES:  Serologies Latest Ref Rng & Units 1/22/2020   Hep A Ab, Total Negative Negative   Hep B Surface Ag None Detec None Detected   Hep B Core Ab, Total None Detec None Detected   Hep B Surface Ab None Detec None Detected   Ferritin 10 - 291 ng/mL 246   Iron % Saturation 20 - 50 % 23   C-ANCA Neg:<1:20 titer <1:20   ANCA Neg:<1:20 titer <1:20   ASMCA 0 - 19 Units 6   M2 Ab 0.0 - 20.0 Units <20.0   Ceruloplasmin 19.0 - 39.0 mg/dL 20.1   Alpha-1 antitrypsin level 101 - 187 mg/dL 165     LIVER HISTOLOGY:  Not available or performed    ENDOSCOPIC PROCEDURES:  Not available or performed    RADIOLOGY:  11/2019. CT scan abdomen without IV contrast.  Changes consistent with cirrhosis. No liver mass lesions. No dilated bile ducts. No ascites.     OTHER TESTING:  Not available or performed    FOLLOW-UP:  All of the issues listed above in the Assessment and Plan were discussed with the patient. All questions were answered. The patient expressed a clear understanding of the above. [address] [address] in 4 weeks for Fibroscan to review all data and determine the treatment plan.       Delilah Kim MD  [address] [address], [address] [facility] [address]  [address] [place] [address]

## 2020-01-23 LAB
A-G RATIO,AGRAT: 2.9 RATIO (ref 1.1–2.6)
ABSOLUTE LYMPHOCYTE COUNT, 10803: 1.7 K/UL (ref 1–4.8)
ALBUMIN SERPL-MCNC: 4.6 G/DL (ref 3.5–5)
ALP SERPL-CCNC: 38 U/L (ref 25–115)
ALT SERPL-CCNC: 27 U/L (ref 5–40)
ANA SCREEN, 8017: NEGATIVE
ANION GAP SERPL CALC-SCNC: 14 MMOL/L
AST SERPL W P-5'-P-CCNC: 31 U/L (ref 10–37)
BASOPHILS # BLD: 0 K/UL (ref 0–0.2)
BASOPHILS NFR BLD: 1 % (ref 0–2)
BILIRUB SERPL-MCNC: 0.5 MG/DL (ref 0.2–1.2)
BILIRUBIN, DIRECT,CBIL: 0.2 MG/DL (ref 0–0.3)
BUN SERPL-MCNC: 18 MG/DL (ref 6–22)
CALCIUM SERPL-MCNC: 10 MG/DL (ref 8.4–10.5)
CHLORIDE SERPL-SCNC: 101 MMOL/L (ref 98–110)
CO2 SERPL-SCNC: 25 MMOL/L (ref 20–32)
CREAT SERPL-MCNC: 0.7 MG/DL (ref 0.5–1.2)
EOSINOPHIL # BLD: 0.2 K/UL (ref 0–0.5)
EOSINOPHIL NFR BLD: 3 % (ref 0–6)
ERYTHROCYTE [DISTWIDTH] IN BLOOD BY AUTOMATED COUNT: 11.9 % (ref 10–15.5)
FE % SATURATION,PSAT: 23 % (ref 20–50)
FERRITIN SERPL-MCNC: 246 NG/ML (ref 10–291)
GFRAA, 66117: >60
GFRNA, 66118: >60
GLOBULIN,GLOB: 1.6 G/DL (ref 2–4)
GLUCOSE SERPL-MCNC: 111 MG/DL (ref 70–99)
GRANULOCYTES,GRANS: 60 % (ref 40–75)
HBV SURFACE AB SER RIA-ACNC: NORMAL
HCT VFR BLD AUTO: 41.2 % (ref 35.1–48)
HEP B CORE AB, TOTAL, 006718: NORMAL
HEP B SURFACE AG SCRN, 006510: NORMAL
HGB BLD-MCNC: 13.4 G/DL (ref 11.7–16)
INR PPP: 1.06 (ref 0.89–1.29)
IRON,IRN: 80 MCG/DL (ref 30–160)
LYMPHOCYTES, LYMLT: 28 % (ref 20–45)
MCH RBC QN AUTO: 35 PG (ref 26–34)
MCHC RBC AUTO-ENTMCNC: 33 G/DL (ref 31–36)
MCV RBC AUTO: 107 FL (ref 81–99)
MONOCYTES # BLD: 0.5 K/UL (ref 0.1–1)
MONOCYTES NFR BLD: 8 % (ref 3–12)
NEUTROPHILS # BLD AUTO: 3.8 K/UL (ref 1.8–7.7)
PLATELET # BLD AUTO: 120 K/UL (ref 140–440)
PMV BLD AUTO: 12.9 FL (ref 9–13)
POTASSIUM SERPL-SCNC: 4.1 MMOL/L (ref 3.5–5.5)
PROT SERPL-MCNC: 6.2 G/DL (ref 6.4–8.3)
PROTHROMBIN TIME: 11 SEC (ref 9–13)
RBC # BLD AUTO: 3.85 M/UL (ref 3.8–5.2)
SODIUM SERPL-SCNC: 140 MMOL/L (ref 133–145)
TIBC,TIBC: 340 MCG/DL (ref 228–428)
UIBC SERPL-MCNC: 260 MCG/DL (ref 110–370)
WBC # BLD AUTO: 6.3 K/UL (ref 4–11)

## 2020-01-24 LAB
ACTIN (SMOOTH MUSCLE) ANTIBODY, 006644: 6 UNITS (ref 0–19)
AFP, SERUM, 141303: 7.3 NG/ML (ref 0–8)
AFP-L3%, SERUM, 141302: 7.3 % (ref 0–9.9)
ALPHA-1 ANTITRYPSIN,A1ATR: 165 MG/DL (ref 101–187)
ATYPICAL PANCA: NORMAL TITER
CERULOPLASMIN,CERUL: 20.1 MG/DL (ref 19–39)
CYTOPLASMIC (C-ANCA), 162400: NORMAL TITER
HEP A AB, TOTAL, 006726: NEGATIVE
MICHOCHONDRIAL (M2) AB, 006652: <20 UNITS (ref 0–20)
MISCELLANEOUS TEST,99000: NORMAL
PERINULCEAR (P-ANCA), 13235: NORMAL TITER
SENT TO, 434: NORMAL
TEST NAME, 435: NORMAL

## 2020-03-02 ENCOUNTER — OFFICE VISIT (OUTPATIENT)
Dept: HEMATOLOGY | Age: 60
End: 2020-03-02

## 2020-03-02 VITALS
HEIGHT: 65 IN | WEIGHT: 163 LBS | HEART RATE: 63 BPM | RESPIRATION RATE: 16 BRPM | DIASTOLIC BLOOD PRESSURE: 70 MMHG | BODY MASS INDEX: 27.16 KG/M2 | TEMPERATURE: 97 F | OXYGEN SATURATION: 98 % | SYSTOLIC BLOOD PRESSURE: 119 MMHG

## 2020-03-02 DIAGNOSIS — K70.30 ALCOHOLIC CIRRHOSIS OF LIVER WITHOUT ASCITES (HCC): Primary | ICD-10-CM

## 2020-03-02 NOTE — PROGRESS NOTES
[address] \[facility]\"", MD, Arlyss Knows, Frank Sora, MD Hazle Seip, PA-C Lori Parrot, [facility]     April S Kenyon, [facility]   Gato AUGUSTUS Minaya-MARCO A Rosales, [facility]       Bill Magana [facility] [address]    at [address], [facility] [place]  [address].    [phone]    FAX: [address] [place] [address], [address]    [phone]    FAX: [phone]       Patient Care Team:  Haja Santos MD as PCP - General (Family Practice)  Diana Vazquez MD as Physician (Gynecologic Oncology)      Problem List  Date Reviewed: 1/29/2020          Codes Class Noted    Cirrhosis ([address] ICD-10-CM: K74.60  ICD-9-CM: 571.5  1/22/2020        S/P JOSELITO (total abdominal hysterectomy) ICD-10-CM: Z90.710  ICD-9-CM: V88.01  1/22/2020        H/O ventral hernia repair ICD-10-CM: Z98.890, Z87.19  ICD-9-CM: V15.29  1/22/2020        Alcohol abuse, in remission ICD-10-CM: F10.11  ICD-9-CM: 305.03  1/22/2020        Endometrial cancer [facility]) ICD-10-CM: C54.1  ICD-9-CM: 182.0  4/29/2013              Tia Jacobson returns to the [address] for management of 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 The active problem list, all pertinent past medical history, medications, liver histology, endoscopic studies, radiologic findings, and laboratory findings related to the liver disorder were reviewed with the patient. The patient is a 64 y.o.  female who was found to have cirrhosis in 11/2019 when she developed swelling of the abdomen and had a CT scan. An assessment of liver fibrosis with Fibroscan was performed at today's office visit.  The result was E mean 37.7 kPa which correlates with cirrhosis. Serologic evaluation for markers of chronic liver disease are negative. The patient has not developed any of the major complications of cirrhosis to date. The patient has no symptoms which can be attributed to the liver disorder. The patient is not currently experiencing the following symptoms of liver disease:  fatigue, pain in the right side over the liver,     The patient completes all daily activities without any functional limitations. ASSESSMENT AND PLAN:  Cirrhosis  Cirrhosis is presumed secondary to alcohol. The diagnosis of cirrhosis is based upon imaging, laboratory studies, Fibroscan. Have performed laboratory testing to monitor liver function and degree of liver injury. This included BMP, hepatic panel, CBC with platelet count, INR. Liver transaminases are normal.  ALP is normal.  Liver function is normal.  The platelet count is normal.      Serologic testing for causes of chronic liver disease were ordered. All were negative     The patient has normal liver function. The patient has never developed any complications of cirrhosis to date. The CTP is 5. Child class A. The MELD score is 8. Ascites   Ascites has not been documented. She was given 5 days of diuretics for abdominal swelling but the CT scan in the ED did not show any ascites. Screening for Esophageal varices   The patient has not had an EGD to screen for varices. EGD to assess for varices and need for banding is scheduled for 3/9/2020. Hepatic encephalopathy   Overt HE has not developed to date. There is no need for treatment with lactulose and/or Xifaxan at this time. There is no need to restrict dietary protein at this time. Thrombocytopenia   This is secondary to cirrhosis. There is no evidence of overt bleeding. No treatment is required.   The platelet count is adequate for the patient to undergo procedures without the need for platelet transfusion or platelet growth factors. Screening for Hepatocellular Carcinoma  HCC screening has recently been performed and does not suggest [facility] [address] The next liver imaging study will be performed in 5/2020. Treatment of other medical problems in patients with chronic liver disease  There are no contraindications for the patient to take most medications that are necessary for treatment of other medical issues. The patient has cirrhrosis and should avoid taking NSAIDs which are associated with a higher rate of developing GERSON. Counseling for alcohol in patients with chronic liver disease  The patient has cirrhosis and was advised to be abstinent from all alcohol including non-alcoholic beer which does contain some alcohol. The patient has not consumed alcohol since 11/2019. Osteoporosis  The risk of osteoporosis is increased in patients with cirrhosis. DEXA bone density to assess for osteoporosis has not been performed. This should be ordered by the patients primary care physician. Vaccinations   Vaccination for viral hepatitis A and B is recommended since the patient has no serologic evidence of previous exposure or vaccination with immunity. Routine vaccinations against other bacterial and viral agents can be performed as indicated. Annual flu vaccination should be administered if indicated. ALLERGIES  No Known Allergies    MEDICATIONS  Current Outpatient Medications   Medication Sig    cholecalciferol (VITAMIN D3) 1,000 unit cap Take  by mouth daily.  traZODone (DESYREL) 100 mg tablet Take 100 mg by mouth nightly.  CA-VB-SF-Fe-Min-Lycopen-Lutein (CENTRUM) 0.4-162-18 mg tab Take  by mouth.  fenofibrate (TRICOR) 160 mg tablet Take 160 mg by mouth daily.  metFORMIN (GLUCOPHAGE) 500 mg tablet Take  by mouth two (2) times daily (with meals).  metoprolol (LOPRESSOR) 100 mg tablet Take  by mouth two (2) times a day.  omeprazole (PRILOSEC) 20 mg capsule Take 20 mg by mouth daily.       OMEGA-3 ACID ETHYL ESTERS (LOVAZA PO) Take  by mouth.  lisinopril (PRINIVIL, ZESTRIL) 20 mg tablet Take  by mouth daily.  citalopram (CELEXA) 40 mg tablet Take 40 mg by mouth daily. No current facility-administered medications for this visit. SYSTEM REVIEW NOT RELATED TO LIVER DISEASE OR REVIEWED ABOVE:  Constitution systems: Negative for fever, chills, weight gain, weight loss. Eyes: Negative for visual changes. ENT: Negative for sore throat, painful swallowing. Respiratory: Negative for cough, hemoptysis, SOB. Cardiology: Negative for chest pain, palpitations. GI:  Negative for constipation or diarrhea. : Negative for urinary frequency, dysuria, hematuria, nocturia. Skin: Negative for rash. Hematology: Negative for easy bruising, blood clots. Musculo-skelatal: Negative for back pain, muscle pain, weakness. Neurologic: Negative for headaches, dizziness, vertigo, memory problems not related to HE. Psychology: Negative for anxiety, depression. FAMILY HISTORY:  The father  of mesothelioma. The mother  of breast cancer. There is no family history of liver disease. SOCIAL HISTORY:  The patient is . The patient has 2 children, and 2 grandchildren. The patient stopped using tobacco products in 2019. The patient consumed 1/2 pint of alcohol per day   The patient has been abstinent from alcohol since 2019. The patient currently works full time as an  for [employer]. PHYSICAL EXAMINATION:  Visit Vitals  /70 (BP 1 Location: Left arm, BP Patient Position: Sitting)   Pulse 63   Temp 97 °F (36.1 °C) (Oral)   Resp 16   Ht 5' 5\" (1.651 m)   Wt 163 lb (73.9 kg)   SpO2 98%   BMI 27.12 kg/m²     General: No acute distress. Eyes: Sclera anicteric. ENT: No oral lesions. Thyroid normal.  Nodes: No adenopathy. Skin: No spider angiomata. No jaundice. No palmar erythema. Respiratory: Lungs clear to auscultation. Cardiovascular: Regular heart rate. No murmurs. No JVD. Abdomen: Soft non-tender. Liver size normal to percussion/palpation. Spleen not palpable. No obvious ascites. Extremities: No edema. No muscle wasting. No gross arthritic changes. Neurologic: Alert and oriented. Cranial nerves grossly intact. No asterixis. LABORATORY STUDIES:  [facility] [place] [facility] [address] & Units 1/22/2020   WBC 4.0 - 11.0 K/uL 6.3   ANC 1.8 - 7.7 K/uL 3.8   HGB 11.7 - 16.0 g/dL 13.4    - 440 K/uL 120 (L)   INR 0.89 - 1.29 1.06   AST 10 - 37 U/L 31   ALT 5 - 40 U/L 27   Alk Phos 25 - 115 U/L 38   Bili, Total 0.2 - 1.2 mg/dL 0.5   Bili, Direct 0.0 - 0.3 mg/dL 0.2   Albumin 3.5 - 5.0 g/dL 4.6   BUN 6 - 22 mg/dL 18   Creat 0.5 - 1.2 mg/dL 0.7   Na 133 - 145 mmol/L 140   K 3.5 - 5.5 mmol/L 4.1   Cl 98 - 110 mmol/L 101   CO2 20 - 32 mmol/L 25   Glucose 70 - 99 mg/dL 111 (H)     Cancer Screening Latest Ref Rng & Units 1/22/2020   AFP, Serum 0.0 - 8.0 ng/mL 7.3   AFP-L3% 0.0 - 9.9 % 7.3     SEROLOGIES:  Serologies Latest Ref Rng & Units 1/22/2020   Hep A Ab, Total Negative Negative   Hep B Surface Ag None Detec None Detected   Hep B Core Ab, Total None Detec None Detected   Hep B Surface Ab None Detec None Detected   Ferritin 10 - 291 ng/mL 246   Iron % Saturation 20 - 50 % 23   C-ANCA Neg:<1:20 titer <1:20   ANCA Neg:<1:20 titer <1:20   ASMCA 0 - 19 Units 6   M2 Ab 0.0 - 20.0 Units <20.0   Ceruloplasmin 19.0 - 39.0 mg/dL 20.1   Alpha-1 antitrypsin level 101 - 187 mg/dL 165     LIVER HISTOLOGY:  3/2020. Fib[facility]. EkPa was 37.7. IQR/med 15%. . The results suggested a fibrosis level of F4. The CAP score suggests fatty liver    ENDOSCOPIC PROCEDURES:  Not available or performed    RADIOLOGY:  11/2019. CT scan abdomen without IV contrast.  Changes consistent with cirrhosis. No liver mass lesions. No dilated bile ducts. No ascites.     OTHER TESTING:  Not available or performed    FOLLOW-UP:  All of the issues listed above in the Assessment and Plan were discussed with the patient. All questions were answered. The patient expressed a clear understanding of the above. [address] [address] in 3 months for routine monitoring.        KARI Bernard  [address] [place] [address]  [place], [address] [place] [address] Street [address]  [phone]

## 2020-03-02 NOTE — ADDENDUM NOTE
Addended by: Lanette Rocha on: 3/2/2020 03:10 PM     Modules accepted: Orders Patient ID: Chrissy Pascal is a 87 Negative.    Allergic/Immunologic: Negative for environmental allergies and food allergies.   Neurological: Negative.    Hematological: Negative.    Psychiatric/Behavioral: Negative for agitation, behavioral problems, confusion, decreased concentration, dysphoric mood and sleep disturbance. The patient is not nervous/anxious and is not hyperactive.        ALLERGIES:  Nabumetone and Naproxen    Patient Active Problem List   Diagnosis   • Presbycusis of left ear   • Tinnitus of both ears   • Stool contents finding, abnormal   • Hyperlipidemia, mixed   • Hyperglycemia   • Chronic kidney disease (CKD), stage III (moderate) (CMS/HCC)   • Bradycardia, sinus   • History of positive PPD   • Hx of adenomatous colonic polyps   • Urinary, incontinence, stress female   • Asymptomatic microscopic hematuria   • History of breast cancer   • Chronic midline low back pain without sciatica   • Screening for breast cancer   • Screening for colon cancer   • Postnasal drip   • Allergic rhinitis due to pollen       History reviewed. No pertinent past medical history.    Past Surgical History:   Procedure Laterality Date   • Breast surgery Left     lumpectomy   • Eye surgery Bilateral     cataract   • Mastoidectomy,complete Right        Family History   Problem Relation Age of Onset   • Heart disease Mother    • Stroke Mother    • Hyperlipidemia Mother    • Hypertension Mother    • Osteoporosis Mother        Social History     Tobacco Use   • Smoking status: Former Smoker     Packs/day: 0.00     Last attempt to quit: 1989     Years since quittin.3   • Smokeless tobacco: Never Used   Substance Use Topics   • Alcohol use: Not Currently     Frequency: Monthly or less     Drinks per session: 1 or 2     Binge frequency: Never   • Drug use: Never       Home Medications    Medication Directions Start Date End Date   simvastatin (ZOCOR) 40 MG tablet Take 1 tablet by mouth nightly. Indications: High Amount of Fats in the Blood 19     [facility]

## 2020-03-02 NOTE — PROGRESS NOTES
Identified pt with two pt identifiers(name and ). Reviewed record in preparation for visit and have obtained necessary documentation. Chief Complaint   Patient presents with    Follow-up        Health Maintenance Due   Topic    Hepatitis C Screening     Pneumococcal 0-64 years (1 of 1 - PPSV23)    DTaP/Tdap/Td series (1 - Tdap)    Shingrix Vaccine Age 50> (1 of 2)    FOBT Q1Y Age 54-65     Influenza Age 5 to Adult     Breast Cancer Screen Mammogram     PAP AKA CERVICAL CYTOLOGY        Coordination of Care Questionnaire:  :   1) Have you been to an emergency room, urgent care, or hospitalized since your last visit? If yes, where when, and reason for visit? no       2. Have seen or consulted any other health care provider since your last visit? If yes, where when, and reason for visit? NO      3) Do you have an Advanced Directive/ Living Will in place? NO  If yes, do we have a copy on file NO  If no, would you like information NO      Learning Assessment 2018   PRIMARY LEARNER Patient   HIGHEST LEVEL OF EDUCATION - PRIMARY LEARNER  GRADUATED HIGH SCHOOL OR GED   BARRIERS PRIMARY LEARNER NONE   CO-LEARNER CAREGIVER No   PRIMARY LANGUAGE ENGLISH   LEARNER PREFERENCE PRIMARY DEMONSTRATION   ANSWERED BY patient   RELATIONSHIP SELF        3 most recent PHQ Screens 3/2/2020   Little interest or pleasure in doing things Not at all   Feeling down, depressed, irritable, or hopeless Not at all   Total Score PHQ 2 0        Abuse Screening Questionnaire 3/2/2020   Do you ever feel afraid of your partner? N   Are you in a relationship with someone who physically or mentally threatens you? N   Is it safe for you to go home? Y        Fall Risk Assessment, last 12 mths 3/2/2020   Able to walk? Yes   Fall in past 12 months?  No

## 2020-03-08 ENCOUNTER — ANESTHESIA EVENT (OUTPATIENT)
Dept: ENDOSCOPY | Age: 60
End: 2020-03-08
Payer: COMMERCIAL

## 2020-03-08 RX ORDER — SODIUM CHLORIDE 0.9 % (FLUSH) 0.9 %
5-40 SYRINGE (ML) INJECTION AS NEEDED
Status: CANCELLED | OUTPATIENT
Start: 2020-03-08

## 2020-03-08 RX ORDER — FENTANYL CITRATE 50 UG/ML
25 INJECTION, SOLUTION INTRAMUSCULAR; INTRAVENOUS AS NEEDED
Status: CANCELLED | OUTPATIENT
Start: 2020-03-08

## 2020-03-08 RX ORDER — SODIUM CHLORIDE, SODIUM LACTATE, POTASSIUM CHLORIDE, CALCIUM CHLORIDE 600; 310; 30; 20 MG/100ML; MG/100ML; MG/100ML; MG/100ML
75 INJECTION, SOLUTION INTRAVENOUS CONTINUOUS
Status: CANCELLED | OUTPATIENT
Start: 2020-03-08

## 2020-03-08 RX ORDER — DIPHENHYDRAMINE HYDROCHLORIDE 50 MG/ML
12.5 INJECTION, SOLUTION INTRAMUSCULAR; INTRAVENOUS
Status: CANCELLED | OUTPATIENT
Start: 2020-03-08

## 2020-03-08 RX ORDER — FLUMAZENIL 0.1 MG/ML
0.2 INJECTION INTRAVENOUS
Status: CANCELLED | OUTPATIENT
Start: 2020-03-08

## 2020-03-08 RX ORDER — SODIUM CHLORIDE 0.9 % (FLUSH) 0.9 %
5-40 SYRINGE (ML) INJECTION EVERY 8 HOURS
Status: CANCELLED | OUTPATIENT
Start: 2020-03-08

## 2020-03-08 RX ORDER — NALOXONE HYDROCHLORIDE 0.4 MG/ML
0.1 INJECTION, SOLUTION INTRAMUSCULAR; INTRAVENOUS; SUBCUTANEOUS AS NEEDED
Status: CANCELLED | OUTPATIENT
Start: 2020-03-08

## 2020-03-08 NOTE — ANESTHESIA PREPROCEDURE EVALUATION
Relevant Problems   No relevant active problems       Anesthetic History               Review of Systems / Medical History  Patient summary reviewed, nursing notes reviewed and pertinent labs reviewed    Pulmonary          Smoker         Neuro/Psych         Psychiatric history     Cardiovascular    Hypertension                   GI/Hepatic/Renal           Liver disease     Endo/Other    Diabetes: type 2         Other Findings              Physical Exam    Airway  Mallampati: II  TM Distance: 4 - 6 cm  Neck ROM: normal range of motion   Mouth opening: Normal     Cardiovascular    Rhythm: regular  Rate: normal         Dental    Dentition: Poor dentition     Pulmonary  Breath sounds clear to auscultation               Abdominal  GI exam deferred       Other Findings            Anesthetic Plan    ASA: 3  Anesthesia type: MAC          Induction: Intravenous  Anesthetic plan and risks discussed with: Patient

## 2020-03-09 ENCOUNTER — ANESTHESIA (OUTPATIENT)
Dept: ENDOSCOPY | Age: 60
End: 2020-03-09
Payer: COMMERCIAL

## 2020-03-09 ENCOUNTER — HOSPITAL ENCOUNTER (OUTPATIENT)
Age: 60
Setting detail: OUTPATIENT SURGERY
Discharge: HOME | End: 2020-03-09
Attending: INTERNAL MEDICINE | Admitting: INTERNAL MEDICINE
Payer: COMMERCIAL

## 2020-03-09 VITALS
HEIGHT: 65 IN | SYSTOLIC BLOOD PRESSURE: 106 MMHG | BODY MASS INDEX: 27.38 KG/M2 | OXYGEN SATURATION: 97 % | TEMPERATURE: 96.4 F | RESPIRATION RATE: 17 BRPM | HEART RATE: 60 BPM | DIASTOLIC BLOOD PRESSURE: 59 MMHG | WEIGHT: 164.3 LBS

## 2020-03-09 LAB — GLUCOSE BLD STRIP.AUTO-MCNC: 115 MG/DL (ref 70–110)

## 2020-03-09 PROCEDURE — 88342 IMHCHEM/IMCYTCHM 1ST ANTB: CPT

## 2020-03-09 PROCEDURE — 76040000019: Performed by: INTERNAL MEDICINE

## 2020-03-09 PROCEDURE — 74011250636 HC RX REV CODE- 250/636: Performed by: ANESTHESIOLOGY

## 2020-03-09 PROCEDURE — 82962 GLUCOSE BLOOD TEST: CPT

## 2020-03-09 PROCEDURE — 77030013991 HC SNR POLYP ENDOSC BSC -A: Performed by: INTERNAL MEDICINE

## 2020-03-09 PROCEDURE — 88305 TISSUE EXAM BY PATHOLOGIST: CPT

## 2020-03-09 PROCEDURE — 74011000250 HC RX REV CODE- 250: Performed by: ANESTHESIOLOGY

## 2020-03-09 PROCEDURE — 76060000031 HC ANESTHESIA FIRST 0.5 HR: Performed by: INTERNAL MEDICINE

## 2020-03-09 RX ORDER — ATROPINE SULFATE 0.1 MG/ML
0.5 INJECTION INTRAVENOUS
Status: CANCELLED | OUTPATIENT
Start: 2020-03-09 | End: 2020-03-10

## 2020-03-09 RX ORDER — NALOXONE HYDROCHLORIDE 0.4 MG/ML
0.4 INJECTION, SOLUTION INTRAMUSCULAR; INTRAVENOUS; SUBCUTANEOUS
Status: CANCELLED | OUTPATIENT
Start: 2020-03-09 | End: 2020-03-09

## 2020-03-09 RX ORDER — PROPOFOL 10 MG/ML
INJECTION, EMULSION INTRAVENOUS AS NEEDED
Status: DISCONTINUED | OUTPATIENT
Start: 2020-03-09 | End: 2020-03-09 | Stop reason: HOSPADM

## 2020-03-09 RX ORDER — FLUMAZENIL 0.1 MG/ML
0.2 INJECTION INTRAVENOUS
Status: CANCELLED | OUTPATIENT
Start: 2020-03-09 | End: 2020-03-09

## 2020-03-09 RX ORDER — LIDOCAINE HYDROCHLORIDE 20 MG/ML
INJECTION, SOLUTION EPIDURAL; INFILTRATION; INTRACAUDAL; PERINEURAL AS NEEDED
Status: DISCONTINUED | OUTPATIENT
Start: 2020-03-09 | End: 2020-03-09 | Stop reason: HOSPADM

## 2020-03-09 RX ORDER — SODIUM CHLORIDE 0.9 % (FLUSH) 0.9 %
5-40 SYRINGE (ML) INJECTION AS NEEDED
Status: CANCELLED | OUTPATIENT
Start: 2020-03-09

## 2020-03-09 RX ORDER — SODIUM CHLORIDE 0.9 % (FLUSH) 0.9 %
5-40 SYRINGE (ML) INJECTION EVERY 8 HOURS
Status: CANCELLED | OUTPATIENT
Start: 2020-03-09

## 2020-03-09 RX ORDER — DEXTROMETHORPHAN/PSEUDOEPHED 2.5-7.5/.8
1.2 DROPS ORAL
Status: CANCELLED | OUTPATIENT
Start: 2020-03-09

## 2020-03-09 RX ORDER — EPINEPHRINE 0.1 MG/ML
1 INJECTION INTRACARDIAC; INTRAVENOUS
Status: CANCELLED | OUTPATIENT
Start: 2020-03-09 | End: 2020-03-10

## 2020-03-09 RX ORDER — SODIUM CHLORIDE 9 MG/ML
100 INJECTION, SOLUTION INTRAVENOUS CONTINUOUS
Status: CANCELLED | OUTPATIENT
Start: 2020-03-09 | End: 2020-03-09

## 2020-03-09 RX ADMIN — PROPOFOL 20 MG: 10 INJECTION, EMULSION INTRAVENOUS at 08:32

## 2020-03-09 RX ADMIN — PROPOFOL 50 MG: 10 INJECTION, EMULSION INTRAVENOUS at 08:30

## 2020-03-09 RX ADMIN — PROPOFOL 100 MG: 10 INJECTION, EMULSION INTRAVENOUS at 08:28

## 2020-03-09 RX ADMIN — LIDOCAINE HYDROCHLORIDE 80 MG: 20 INJECTION, SOLUTION EPIDURAL; INFILTRATION; INTRACAUDAL; PERINEURAL at 08:28

## 2020-03-09 NOTE — DISCHARGE INSTRUCTIONS
[address] [facility] Ilene Fail, Jose Ferguson MD Marveen Doffing, GOMEZ Dav[place], [facility]     April S Kenyon [facility]   Kylah Reyes FNP-C    Lynnette Morse, [facility]       Bill Magana [facility] [address]    at [address], [address] [place] [address]  22.    [phone]    FAX: [address], [address]    [phone]    FAX: [phone]         ENDOSCOPY DISCHARGE INSTRUCTIONS      Cheyenne Orellana  1960  Date: 3/9/2020    DISCOMFORT:  Use lozenges or warm salt water gargle for sore thoat  Apply warm compress to IV site if red. If redness or soreness persists call the office. You may experience gas and bloating. Walking and belching will help relieve this. You may experience chest discomfort or pressure especially if banding of esophageal varices was performed. DIET:  You may resume your normal diet. ACTIVITY:  Spend the remainder of the day resting. Avoid any strenuous activity. You may not operate a vehicle for 12 hours. You may not engage in an occupation involving machinery or appliances for rest of today. Avoid making any critical or financial decisions for at least 24 hour. Call the [employer] [address] if you have any of the following:  Increasing chest or abdominal pain, nausea, vomiting, vomiting blood, abdominal distension or swelling, fever or chills, bloody discharge from nose or mouth or shortness of breath. Follow-up Instructions:  Call Dr. Jina Shirley for any questions or problems at the phone number listed above. If a biopsy was performed, you will be contacted by the office staff or Dr Jina Shirley within 1 week.    If you have not heard from us by then you may call the office at the phone number listed above to inquire about the results. ENDOSCOPY FINDINGS:  Your endoscopy demonstrated polyp in the stomach and mild irritation/redness in the stomach  . The polyp was removed  A biopsy of the stomach was taken. Will repeat EGD to look for development of varices in 3 years. DISCHARGE SUMMARY from the Nurse: The following personal items collected during your admission are returned to you:   Dental Appliance: Dental Appliances: None  Vision: Visual Aid: Glasses  Hearing Aid:    Jewelry:    Clothing:    Other Valuables:    Valuables sent to safe:         DISCHARGE SUMMARY from Nurse    PATIENT INSTRUCTIONS:    After general anesthesia or intravenous sedation, for 24 hours or while taking prescription Narcotics:  · Limit your activities  · Do not drive and operate hazardous machinery  · Do not make important personal or business decisions  · Do  not drink alcoholic beverages  · If you have not urinated within 8 hours after discharge, please contact your surgeon on call. Report the following to your surgeon:  · Excessive pain, swelling, redness or odor of or around the surgical area  · Temperature over 100.5  · Nausea and vomiting lasting longer than 4 hours or if unable to take medications  · Any signs of decreased circulation or nerve impairment to extremity: change in color, persistent  numbness, tingling, coldness or increase pain  · Any questions    What to do at Home:  Recommended activity: as above. If you experience any of the following symptoms as above, please follow up with Dr. Agueda Maloney. *  Please give a list of your current medications to your Primary Care Provider. *  Please update this list whenever your medications are discontinued, doses are      changed, or new medications (including over-the-counter products) are added. *  Please carry medication information at all times in case of emergency situations.     These are general instructions for a healthy lifestyle:    No smoking/ No tobacco products/ Avoid exposure to second hand smoke  Surgeon General's Warning:  Quitting smoking now greatly reduces serious risk to your health. Obesity, smoking, and sedentary lifestyle greatly increases your risk for illness    A healthy diet, regular physical exercise & weight monitoring are important for maintaining a healthy lifestyle    You may be retaining fluid if you have a history of heart failure or if you experience any of the following symptoms:  Weight gain of 3 pounds or more overnight or 5 pounds in a week, increased swelling in our hands or feet or shortness of breath while lying flat in bed. Please call your doctor as soon as you notice any of these symptoms; do not wait until your next office visit. The discharge information has been reviewed with the patient and spouse. The patient and spouse verbalized understanding. Discharge medications reviewed with the patient and spouse and appropriate educational materials and side effects teaching were provided.   ___________________________________________________________________________________________________________________________________      Patient armband removed and shredded

## 2020-03-09 NOTE — ANESTHESIA POSTPROCEDURE EVALUATION
Procedure(s):  EGD WITH MAC ANESTHESIA.     MAC    Anesthesia Post Evaluation      Multimodal analgesia: multimodal analgesia used between 6 hours prior to anesthesia start to PACU discharge  Patient location during evaluation: PACU  Patient participation: complete - patient participated  Level of consciousness: awake and alert  Pain management: satisfactory to patient  Airway patency: patent  Anesthetic complications: no  Cardiovascular status: acceptable, hemodynamically stable and blood pressure returned to baseline  Respiratory status: acceptable and nasal cannula  Hydration status: acceptable  Post anesthesia nausea and vomiting:  none      Vitals Value Taken Time   /59 3/9/2020  8:58 AM   Temp     Pulse 60 3/9/2020  8:58 AM   Resp 17 3/9/2020  8:58 AM   SpO2 97 % 3/9/2020  8:58 AM

## 2020-03-09 NOTE — PROCEDURES
3340 [facility], Lorenzo DUMONT Cite Mongi Slim, Freddi Mix, MD Gussie Jetty, GOMEZ Berg, [facility], [facility]   HÉCTOR Stauffer, [facility]       Bill Magana [facility] [address]    at [address], [address] [place] [address], [facility] 22.    [phone]    FAX: [address] [place] [address], [address] [place] [address] - [address]    [phone]    FAX: [phone]         UPPER ENDOSCOPY PROCEDURE NOTE    Jayde Knows  1960    INDICATION: Cirrhosis. Screening for esophageal varices with variceal ligation if  indicated. : Zunilda Blair MD    SURGICAL ASSISTANT:  None    PROSTHETIC DEVISES, TISSUE GRAFTS, ORGAN TRANSPLANTS:  Not applicable     ANESTHESIA/SEDATION: Sedation per anesthesiology    PROCEDURE DESCRIPTION:  [employer] consent was obtained from the patient for the procedure. All risks and benefits of the procedure explained. The patient was taken to the endoscopy suite and placed in the left lateral decubitus position. Following sequential administration of sedation to doses as indicated above the endoscope was inserted into the mouth and advanced under direct vision to the second portion of the duodenum. Careful inspection of upper gastrointestinal tract was made as the endoscope was inserted and withdrawn. Retroflexion of the endoscope to view of the cardia of the stomach was performed. The findings and interventions are described below. FINDINGS:  Esophagus:    Normal.      Stomach:   No portal hypertensive gastropathy   Gastric polyp in mid body. Gastritis of the antrum  No gastric varices identified.     Duodenum:   Normal bulb and second portion    INTERVENTION:   Gastric poly removed with hot snare  2 biopsies were taken from the antrum. The specimens were placed in preservative and transported to Pathology after the procedure. COMPLICATIONS: None. The patient tolerated the procedure well. EBL: Negligible. RECOMMENDATIONS:  Observe until discharge parameters are achieved. May resume previous diet. Repeat endoscopy to reassess varices and need for banding in 3 years. Follow-up [facility] office as scheduled.       Dom Barney MD  [address]  4 [facility], [address] [place] [address], [address] [place] [address] Street [address]  [address] [place] [address]

## 2020-03-09 NOTE — H&P
3340 [facility], MD, [address], Cite Hung Phillips MD Meridith Kaiser, PA-C Lindi Flank, [facility]     April S Kenyon, [facility]   HÉCTOR Gold, [facility]       Bill Deput[address]    at [place] [id number], [address] [place] [address]  [address].    [phone]    FAX: [address] [place] [address], [address]    [phone]    FAX: [phone]         PRE-PROCEDURE NOTE - EGD    H and P from last office visit reviewed. Allergies reviewed. Out-patient medicaton list reviewed. Patient Active Problem List   Diagnosis Code    Endometrial cancer ([facility] [address] C54.1    Cirrhosis ([facility] [address] K74.60    S/P JOSELITO (total abdominal hysterectomy) Z90.710    H/O ventral hernia repair Z98.890, Z87.19    Alcohol abuse, in remission F10.11       No Known Allergies    No current facility-administered medications on file prior to encounter. Current Outpatient Medications on File Prior to Encounter   Medication Sig Dispense Refill    cholecalciferol (VITAMIN D3) 1,000 unit cap Take  by mouth daily.  traZODone (DESYREL) 100 mg tablet Take 100 mg by mouth nightly.  MA-TB-WT-Fe-Min-Lycopen-Lutein (CENTRUM) 0.4-162-18 mg tab Take  by mouth.  fenofibrate (TRICOR) 160 mg tablet Take 160 mg by mouth daily.  metFORMIN (GLUCOPHAGE) 500 mg tablet Take  by mouth two (2) times daily (with meals).  metoprolol (LOPRESSOR) 100 mg tablet Take  by mouth two (2) times a day.  omeprazole (PRILOSEC) 20 mg capsule Take 20 mg by mouth daily.  OMEGA-3 ACID ETHYL ESTERS (LOVAZA PO) Take  by mouth.  lisinopril (PRINIVIL, ZESTRIL) 20 mg tablet Take  by mouth daily.         citalopram (CELEXA) 40 mg tablet Take 40 mg by mouth daily. For EGD to assess for esophageal and gastric varices. Plan to perform banding if indicated based upon variceal size and appearance. The risks of the procedure were discussed with the patient. These included reaction to anesthesia, pain, perforation and bleeding. All questions were answered. The patient wishes to proceed with the procedure. PHYSICAL EXAMINATION:  VS: per nursing note  General: No acute distress. Eyes: Sclera anicteric. ENT: No oral lesions. Thyroid normal.  Nodes: No adenopathy. Skin: No spider angiomata. No jaundice. No palmar erythema. Respiratory: Lungs clear to auscultation. Cardiovascular: Regular heart rate. No murmurs. No JVD. Abdomen: Soft non-tender, liver size normal to percussion/palpation. Spleen not palpable. No obvious ascites. Extremities: No edema. No muscle wasting. No gross arthritic changes. Neurologic: Alert and oriented. Cranial nerves grossly intact. No asterixis. MOST RECENT LABORATORY STUDIES:  Lab Results   Component Value Date/Time    WBC 6.3 01/22/2020 04:37 PM    HGB 13.4 01/22/2020 04:37 PM    HCT 41.2 01/22/2020 04:37 PM    PLATELET 369 (L) 76/38/5650 04:37 PM     (H) 01/22/2020 04:37 PM     Lab Results   Component Value Date/Time    INR 1.06 01/22/2020 04:37 PM    Prothrombin time 11.0 01/22/2020 04:37 PM       ASSESSMENT AND PLAN:  EGD to assess for esophageal and/or gastric varices.   Sedation per anesthesiology      MD Ebenezer Frausto [address] [place] [address] 58, 300 [facility] [address]  [phone]  [address] [place] [address]

## 2020-09-21 ENCOUNTER — VIRTUAL VISIT (OUTPATIENT)
Dept: HEMATOLOGY | Age: 60
End: 2020-09-21

## 2020-09-21 DIAGNOSIS — K70.30 ALCOHOLIC CIRRHOSIS OF LIVER WITHOUT ASCITES (HCC): Primary | ICD-10-CM

## 2020-09-21 DIAGNOSIS — K70.30 ALCOHOLIC CIRRHOSIS OF LIVER WITHOUT ASCITES (HCC): ICD-10-CM

## 2020-09-21 NOTE — PROGRESS NOTES
VIRTUAL TELEHEALTH VISIT PERFORMED DUE TO COVID-19 EPIDEMIC    CONSENT:  Santos Thayer, who was seen by synchronous, real-time, audio-video technology, and/or her healthcare decision maker, is aware that this patient-initiated, Telehealth encounter on 9/21/2020 is a billable service, with coverage as determined by her insurance carrier. She is aware that she may receive a bill and has provided verbal consent to proceed. This patient was evaluated during a Virtual Telehealth visit. A caregiver was present if appropriate.  Due to this being a TeleHealth encounter performed during the [facility] emergency, the physical examination was limited to that listed in the [address] [place] [address], MD, Julia Magana, MD Kirstie Bernardo, GOMEZ Phipps, [facility]     April S Kenyon, [facility], [facility] [address], [facility] [address]    at [address].    [phone]    FAX: [address], [address]    [phone]    FAX: [phone]       Patient Care Team:  Mikala Ochoa MD as PCP - General (Family Medicine)  Ebonie Rodrigez MD as Physician (Gynecologic Oncology)      Problem List  Date Reviewed: 1/29/2020          Codes Class Noted    Cirrhosis ([facility] ICD-10-CM: K74.60  ICD-9-CM: 571.5  1/22/2020        S/P JOSELITO (total abdominal hysterectomy) ICD-10-CM: Z90.710  ICD-9-CM: V88.01  1/22/2020        H/O ventral hernia repair ICD-10-CM: D60.256, Z87.19  ICD-9-CM: V15.29  1/22/2020        Alcohol abuse, in remission ICD-10-CM: F10.11  ICD-9-CM: 305.03  1/22/2020        Endometrial cancer [facility]) ICD-10-CM: C54.1  ICD-9-CM: 182.0  4/29/2013              Primo Sanchez returns to the [facility] for management of 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 The active problem list, all pertinent past medical history, medications, liver histology, endoscopic studies, radiologic findings, and laboratory findings related to the liver disorder were reviewed with the patient. The patient is a 64 y.o.  female who was found to have cirrhosis in 11/2019 when she developed swelling of the abdomen and had a CT scan. An assessment of liver fibrosis with Fibroscan was performed in 03/2020. The result was E mean 37.7 kPa which correlates with cirrhosis. Serologic evaluation for markers of chronic liver disease are negative. The patient has not developed any of the major complications of cirrhosis to date. The patient has no symptoms which can be attributed to the liver disorder. The patient is not currently experiencing the following symptoms of liver disease:  fatigue, pain in the right side over the liver,     The patient completes all daily activities without any functional limitations. ASSESSMENT AND PLAN:  Cirrhosis  Cirrhosis is presumed secondary to alcohol. The diagnosis of cirrhosis is based upon imaging, laboratory studies, Fibroscan. Have performed laboratory testing to monitor liver function and degree of liver injury. This included BMP, hepatic panel, CBC with platelet count, INR. Liver transaminases are normal.  ALP is normal.  Liver function is normal.  The platelet count is normal.      Serologic testing for causes of chronic liver disease were ordered. All were negative     The patient has normal liver function. The patient has never developed any complications of cirrhosis to date. The CTP is 5. Child class A. The MELD score is 8. Ascites   Ascites has not been documented.   She was given 5 days of diuretics for abdominal swelling but the CT scan in the ED did not show any ascites. Screening for Esophageal varices   The patient has not had an EGD to screen for varices. EGD to assess for varices and need for banding is scheduled for 3/9/2020. Hepatic encephalopathy   Overt HE has not developed to date. There is no need for treatment with lactulose and/or Xifaxan at this time. There is no need to restrict dietary protein at this time. Thrombocytopenia   This is secondary to cirrhosis. There is no evidence of overt bleeding. No treatment is required. The platelet count is adequate for the patient to undergo procedures without the need for platelet transfusion or platelet growth factors. Screening for Hepatocellular Carcinoma  HCC screening has recently been performed and does not suggest [address] The next liver imaging study will be performed in 5/2020. Fatty liver  AKBAR  The diagnosis is based upon imaging, Fiboscan CAP score, features of metabolic syndrome,   serologic studies that are negative for other causes of chronic liver disease,     Elastography performed in 03/2020 suggests Cirrhosis. Will perform imaging of the liver with ultrasound. If the patient looses 20% of current body weight all steatosis will have resolved. Once all steatosis has resolved all inflammation will resolve. Then all fibrosis will gradually resolve and the liver could eventually be normal.    There is currently no FDA approved medical treatment for fatty liver, NALFD or AKBAR. The only medical treatments for AKBAR are though clinical trials. Counseling for diet and weight loss in patients with confirmed or suspected NAFLD  The patient was counseled regarding diet and exercise to achieve weight loss.   The best diet for patients with fatty liver is one very low in carbohydrates and enriched with protein such as an Yanick's program.      The patient was told not to consume any food products and drinks containing fructose as this enhances hepatic fat synthesis. There is no medication or vitamin supplements that we advocate for AKBAR. Using glitazones in patients without diabetes mellitus has been shown to reduce fat content in the liver but has no effect on fibrosis and is associated with weight gain. Vitamin E has also been used but the data is not very good and most experts no longer advocate this. Treatment of other medical problems in patients with chronic liver disease  There are no contraindications for the patient to take most medications that are necessary for treatment of other medical issues. The patient has cirrhrosis and should avoid taking NSAIDs which are associated with a higher rate of developing GERSON. Counseling for alcohol in patients with chronic liver disease  The patient has cirrhosis and was advised to be abstinent from all alcohol including non-alcoholic beer which does contain some alcohol. The patient has not consumed alcohol since 11/2019. Osteoporosis  The risk of osteoporosis is increased in patients with cirrhosis. DEXA bone density to assess for osteoporosis has not been performed. This should be ordered by the patients primary care physician. Vaccinations   Vaccination for viral hepatitis A and B is recommended since the patient has no serologic evidence of previous exposure or vaccination with immunity. Routine vaccinations against other bacterial and viral agents can be performed as indicated. Annual flu vaccination should be administered if indicated. ALLERGIES  No Known Allergies    MEDICATIONS  Current Outpatient Medications   Medication Sig    cholecalciferol (VITAMIN D3) 1,000 unit cap Take  by mouth daily.  traZODone (DESYREL) 100 mg tablet Take 100 mg by mouth nightly.  IA-CM-XO-Fe-Min-Lycopen-Lutein (CENTRUM) 0.4-162-18 mg tab Take  by mouth.  fenofibrate (TRICOR) 160 mg tablet Take 160 mg by mouth daily.     metFORMIN (GLUCOPHAGE) 500 mg tablet Take by mouth two (2) times daily (with meals).  metoprolol (LOPRESSOR) 100 mg tablet Take  by mouth two (2) times a day.  omeprazole (PRILOSEC) 20 mg capsule Take 20 mg by mouth daily.  OMEGA-3 ACID ETHYL ESTERS (LOVAZA PO) Take  by mouth.  lisinopril (PRINIVIL, ZESTRIL) 20 mg tablet Take  by mouth daily.  citalopram (CELEXA) 40 mg tablet Take 40 mg by mouth daily. No current facility-administered medications for this visit. SYSTEM REVIEW NOT RELATED TO LIVER DISEASE OR REVIEWED ABOVE:  Constitution systems: Negative for fever, chills, weight gain, weight loss. Eyes: Negative for visual changes. ENT: Negative for sore throat, painful swallowing. Respiratory: Negative for cough, hemoptysis, SOB. Cardiology: Negative for chest pain, palpitations. GI:  Negative for constipation or diarrhea. : Negative for urinary frequency, dysuria, hematuria, nocturia. Skin: Negative for rash. Hematology: Negative for easy bruising, blood clots. Musculo-skelatal: Negative for back pain, muscle pain, weakness. Neurologic: Negative for headaches, dizziness, vertigo, memory problems not related to HE. Psychology: Negative for anxiety, depression. FAMILY HISTORY:  The father  of mesothelioma. The mother  of breast cancer. There is no family history of liver disease. SOCIAL HISTORY:  The patient is . The patient has 2 children, and 2 grandchildren. The patient stopped using tobacco products in 2019. The patient consumed 1/2 pint of alcohol per day   The patient has been abstinent from alcohol since 2019. The patient currently works full time as an  for [employer] PHYSICAL EXAMINATION PERFORMED BY [employer]:  VS: Not performed   General: No acute distress. Eyes: Sclera anicteric. ENT: No oral lesions. Skin: No rashes. spider angiomata. No jaundice.     Abdomen: No obvious distention suggesting ascites. Extremities: No edema. No muscle wasting. Neurologic: Alert and oriented. Cranial nerves grossly intact. LABORATORY STUDIES:  [facility] [place] of [address] [place] [address] & Units 1/22/2020   WBC 4.0 - 11.0 K/uL 6.3   ANC 1.8 - 7.7 K/uL 3.8   HGB 11.7 - 16.0 g/dL 13.4    - 440 K/uL 120 (L)   INR 0.89 - 1.29 1.06   AST 10 - 37 U/L 31   ALT 5 - 40 U/L 27   Alk Phos 25 - 115 U/L 38   Bili, Total 0.2 - 1.2 mg/dL 0.5   Bili, Direct 0.0 - 0.3 mg/dL 0.2   Albumin 3.5 - 5.0 g/dL 4.6   BUN 6 - 22 mg/dL 18   Creat 0.5 - 1.2 mg/dL 0.7   Na 133 - 145 mmol/L 140   K 3.5 - 5.5 mmol/L 4.1   Cl 98 - 110 mmol/L 101   CO2 20 - 32 mmol/L 25   Glucose 70 - 99 mg/dL 111 (H)     Cancer Screening Latest Ref Rng & Units 1/22/2020   AFP, Serum 0.0 - 8.0 ng/mL 7.3   AFP-L3% 0.0 - 9.9 % 7.3     SEROLOGIES:  Serologies Latest Ref Rng & Units 1/22/2020   Hep A Ab, Total Negative Negative   Hep B Surface Ag None Detec None Detected   Hep B Core Ab, Total None Detec None Detected   Hep B Surface Ab None Detec None Detected   Ferritin 10 - 291 ng/mL 246   Iron % Saturation 20 - 50 % 23   C-ANCA Neg:<1:20 titer <1:20   ANCA Neg:<1:20 titer <1:20   ASMCA 0 - 19 Units 6   M2 Ab 0.0 - 20.0 Units <20.0   Ceruloplasmin 19.0 - 39.0 mg/dL 20.1   Alpha-1 antitrypsin level 101 - 187 mg/dL 165     LIVER HISTOLOGY:  3/2020. FibroScan performed at [facility]. EkPa was 37.7. IQR/med 15%. . The results suggested a fibrosis level of F4. The CAP score suggests fatty liver    ENDOSCOPIC PROCEDURES:  Not available or performed    RADIOLOGY:  11/2019. CT scan abdomen without IV contrast.  Changes consistent with cirrhosis. No liver mass lesions. No dilated bile ducts. No ascites.     OTHER TESTING:  Not available or performed    FOLLOW-UP AFTER VIRTUAL VISIT:  Pursuant to the emergency declaration under the [facility], [address] [place] [address] waiver authority and the [facility] Response Supplemental Appropriations Act, this Virtual  Visit was conducted, with the patient's (and/or their legal guardian's) consent, to reduce the patient's risk of exposure to COVID-19 and provide necessary medical care. Services were provided through a video synchronous discussion virtually to substitute for an in-person clinic visit. The patient was located in their home. The provider was located in the [place] [address] office. All of the issues listed above in the Assessment and Plan were discussed with the patient. All questions were answered. The patient expressed a clear understanding of the above. [address] in 4 months for routine monitoring.      HÉCTOR Mai  [facility] [place] [facility]  [address] [place] [address], [facility], [address] [place] [address]   [phone]

## 2020-10-02 ENCOUNTER — HOSPITAL ENCOUNTER (OUTPATIENT)
Dept: ULTRASOUND IMAGING | Age: 60
Discharge: HOME | End: 2020-10-02
Payer: COMMERCIAL

## 2020-10-02 DIAGNOSIS — K70.30 ALCOHOLIC CIRRHOSIS OF LIVER WITHOUT ASCITES (HCC): ICD-10-CM

## 2020-10-02 PROCEDURE — 76705 ECHO EXAM OF ABDOMEN: CPT

## 2020-10-05 ENCOUNTER — TELEPHONE (OUTPATIENT)
Dept: HEMATOLOGY | Age: 60
End: 2020-10-05

## 2022-03-18 PROBLEM — K74.60 CIRRHOSIS (HCC): Status: ACTIVE | Noted: 2020-01-22

## 2022-03-18 PROBLEM — Z90.710 S/P TAH (TOTAL ABDOMINAL HYSTERECTOMY): Status: ACTIVE | Noted: 2020-01-22

## 2022-03-19 PROBLEM — F10.11 ALCOHOL ABUSE, IN REMISSION: Status: ACTIVE | Noted: 2020-01-22

## 2022-03-20 PROBLEM — Z87.19 H/O VENTRAL HERNIA REPAIR: Status: ACTIVE | Noted: 2020-01-22

## 2022-03-20 PROBLEM — Z98.890 H/O VENTRAL HERNIA REPAIR: Status: ACTIVE | Noted: 2020-01-22

## 2025-03-26 ENCOUNTER — HOSPITAL ENCOUNTER (OUTPATIENT)
Facility: HOSPITAL | Age: 65
Discharge: HOME | End: 2025-03-29
Payer: COMMERCIAL

## 2025-03-26 ENCOUNTER — HOSPITAL ENCOUNTER (OUTPATIENT)
Facility: HOSPITAL | Age: 65
Setting detail: SPECIMEN
Discharge: HOME | End: 2025-03-29

## 2025-03-26 DIAGNOSIS — M25.561 RIGHT KNEE PAIN, UNSPECIFIED CHRONICITY: ICD-10-CM

## 2025-03-26 DIAGNOSIS — M17.11 OSTEOARTHRITIS OF RIGHT KNEE, UNSPECIFIED OSTEOARTHRITIS TYPE: ICD-10-CM

## 2025-03-26 DIAGNOSIS — Z01.812 PRE-OPERATIVE LABORATORY EXAMINATION: ICD-10-CM

## 2025-03-26 LAB — SENTARA SPECIMEN COLLECTION: NORMAL

## 2025-03-26 PROCEDURE — 99001 SPECIMEN HANDLING PT-LAB: CPT

## 2025-03-26 PROCEDURE — 93005 ELECTROCARDIOGRAM TRACING: CPT

## 2025-03-27 LAB
EKG ATRIAL RATE: 129 BPM
EKG DIAGNOSIS: NORMAL
EKG P-R INTERVAL: 152 MS
EKG Q-T INTERVAL: 336 MS
EKG QRS DURATION: 116 MS
EKG QTC CALCULATION (BAZETT): 492 MS
EKG R AXIS: 52 DEGREES
EKG T AXIS: 3 DEGREES
EKG VENTRICULAR RATE: 129 BPM

## 2025-04-30 ENCOUNTER — CLINICAL DOCUMENTATION (OUTPATIENT)
Age: 65
End: 2025-04-30

## 2025-04-30 NOTE — PROGRESS NOTES
4/30/25 0911: Records received from MEGHNA Barkley at [facility]. Dx: Alcoholic cirrhosis without ascites. Needs appt within 6-8 weeks

## 2025-06-06 ENCOUNTER — TELEPHONE (OUTPATIENT)
Age: 65
End: 2025-06-06

## 2025-06-19 ENCOUNTER — TRANSCRIBE ORDERS (OUTPATIENT)
Facility: HOSPITAL | Age: 65
End: 2025-06-19

## 2025-06-19 ENCOUNTER — HOSPITAL ENCOUNTER (OUTPATIENT)
Facility: HOSPITAL | Age: 65
Setting detail: SPECIMEN
Discharge: HOME | End: 2025-06-22

## 2025-06-19 DIAGNOSIS — M17.11 OSTEOARTHRITIS OF RIGHT KNEE, UNSPECIFIED OSTEOARTHRITIS TYPE: Primary | ICD-10-CM

## 2025-06-19 DIAGNOSIS — Z01.812 PRE-OPERATIVE LABORATORY EXAMINATION: ICD-10-CM

## 2025-06-19 DIAGNOSIS — M25.561 RIGHT KNEE PAIN, UNSPECIFIED CHRONICITY: ICD-10-CM

## 2025-06-19 LAB — SENTARA SPECIMEN COLLECTION: NORMAL

## 2025-06-19 PROCEDURE — 99001 SPECIMEN HANDLING PT-LAB: CPT

## (undated) DEVICE — SINGLE PORT MANIFOLD: Brand: NEPTUNE 2

## (undated) DEVICE — KENDALL RADIOLUCENT FOAM MONITORING ELECTRODE RECTANGULAR SHAPE: Brand: KENDALL

## (undated) DEVICE — MAJ-1414 SINGLE USE ADPATER BIOPSY VALV: Brand: SINGLE USE ADAPTOR BIOPSY VALVE

## (undated) DEVICE — ERBE NESSY®PLATE 170 SPLIT; 168CM²; CABLE 3M: Brand: ERBE

## (undated) DEVICE — MEDI-VAC NON-CONDUCTIVE SUCTION TUBING: Brand: CARDINAL HEALTH

## (undated) DEVICE — SPONGE GZ W4XL4IN COT 12 PLY TYP VII WVN C FLD DSGN

## (undated) DEVICE — TRAP SPEC COLL POLYP POLYSTYR --

## (undated) DEVICE — ENDO CARRY-ON PROCEDURE KIT INCLUDES ENZYMATIC SPONGE, GAUZE, BIOHAZARD LABEL, TRAY, LUBRICANT, DIRTY SCOPE LABEL, WATER LABEL, TRAY, DRAWSTRING PAD, AND DEFENDO 4-PIECE KIT.: Brand: ENDO CARRY-ON PROCEDURE KIT

## (undated) DEVICE — MOUTHPIECE ENDOSCP 20X27MM --

## (undated) DEVICE — SNARE POLYP SM W13MMXL240CM SHTH DIA2.4MM OVL FLX DISP